# Patient Record
Sex: MALE | Race: WHITE | Employment: OTHER | URBAN - METROPOLITAN AREA
[De-identification: names, ages, dates, MRNs, and addresses within clinical notes are randomized per-mention and may not be internally consistent; named-entity substitution may affect disease eponyms.]

---

## 2020-09-16 ENCOUNTER — HOSPITAL ENCOUNTER (EMERGENCY)
Age: 70
Discharge: HOME | End: 2020-09-16
Attending: EMERGENCY MEDICINE
Payer: MEDICARE

## 2020-09-16 ENCOUNTER — APPOINTMENT (OUTPATIENT)
Dept: GENERAL RADIOLOGY | Age: 70
End: 2020-09-16
Attending: EMERGENCY MEDICINE
Payer: MEDICARE

## 2020-09-16 VITALS
OXYGEN SATURATION: 95 % | RESPIRATION RATE: 16 BRPM | TEMPERATURE: 98.2 F | BODY MASS INDEX: 21.48 KG/M2 | DIASTOLIC BLOOD PRESSURE: 89 MMHG | SYSTOLIC BLOOD PRESSURE: 129 MMHG | WEIGHT: 141.76 LBS | HEIGHT: 68 IN | HEART RATE: 75 BPM

## 2020-09-16 DIAGNOSIS — R42 DIZZINESS: ICD-10-CM

## 2020-09-16 DIAGNOSIS — I16.0 HYPERTENSIVE URGENCY: Primary | ICD-10-CM

## 2020-09-16 LAB
ALBUMIN SERPL-MCNC: 3.7 G/DL (ref 3.5–5)
ALBUMIN/GLOB SERPL: 0.9 {RATIO} (ref 1.1–2.2)
ALP SERPL-CCNC: 79 U/L (ref 45–117)
ALT SERPL-CCNC: 24 U/L (ref 12–78)
ANION GAP SERPL CALC-SCNC: 11 MMOL/L (ref 5–15)
APPEARANCE UR: CLEAR
AST SERPL-CCNC: 13 U/L (ref 15–37)
BACTERIA URNS QL MICRO: NEGATIVE /HPF
BASOPHILS # BLD: 0.1 K/UL (ref 0–0.1)
BASOPHILS NFR BLD: 1 % (ref 0–1)
BILIRUB SERPL-MCNC: 0.6 MG/DL (ref 0.2–1)
BILIRUB UR QL: NEGATIVE
BUN SERPL-MCNC: 21 MG/DL (ref 6–20)
BUN/CREAT SERPL: 12 (ref 12–20)
CALCIUM SERPL-MCNC: 9.1 MG/DL (ref 8.5–10.1)
CHLORIDE SERPL-SCNC: 103 MMOL/L (ref 97–108)
CK SERPL-CCNC: 69 U/L (ref 39–308)
CO2 SERPL-SCNC: 27 MMOL/L (ref 21–32)
COLOR UR: ABNORMAL
COMMENT, HOLDF: NORMAL
CREAT SERPL-MCNC: 1.71 MG/DL (ref 0.7–1.3)
DIFFERENTIAL METHOD BLD: ABNORMAL
EOSINOPHIL # BLD: 0.5 K/UL (ref 0–0.4)
EOSINOPHIL NFR BLD: 4 % (ref 0–7)
EPITH CASTS URNS QL MICRO: ABNORMAL /LPF
ERYTHROCYTE [DISTWIDTH] IN BLOOD BY AUTOMATED COUNT: 12.5 % (ref 11.5–14.5)
GLOBULIN SER CALC-MCNC: 4.1 G/DL (ref 2–4)
GLUCOSE SERPL-MCNC: 128 MG/DL (ref 65–100)
GLUCOSE UR STRIP.AUTO-MCNC: NEGATIVE MG/DL
HCT VFR BLD AUTO: 45.4 % (ref 36.6–50.3)
HGB BLD-MCNC: 15.1 G/DL (ref 12.1–17)
HGB UR QL STRIP: ABNORMAL
IMM GRANULOCYTES # BLD AUTO: 0 K/UL (ref 0–0.04)
IMM GRANULOCYTES NFR BLD AUTO: 0 % (ref 0–0.5)
KETONES UR QL STRIP.AUTO: NEGATIVE MG/DL
LEUKOCYTE ESTERASE UR QL STRIP.AUTO: NEGATIVE
LYMPHOCYTES # BLD: 4.2 K/UL (ref 0.8–3.5)
LYMPHOCYTES NFR BLD: 33 % (ref 12–49)
MAGNESIUM SERPL-MCNC: 1.8 MG/DL (ref 1.6–2.4)
MCH RBC QN AUTO: 29.7 PG (ref 26–34)
MCHC RBC AUTO-ENTMCNC: 33.3 G/DL (ref 30–36.5)
MCV RBC AUTO: 89.4 FL (ref 80–99)
MONOCYTES # BLD: 0.8 K/UL (ref 0–1)
MONOCYTES NFR BLD: 6 % (ref 5–13)
NEUTS SEG # BLD: 7 K/UL (ref 1.8–8)
NEUTS SEG NFR BLD: 56 % (ref 32–75)
NITRITE UR QL STRIP.AUTO: NEGATIVE
NRBC # BLD: 0 K/UL (ref 0–0.01)
NRBC BLD-RTO: 0 PER 100 WBC
PH UR STRIP: 6.5 [PH] (ref 5–8)
PHOSPHATE SERPL-MCNC: 2.2 MG/DL (ref 2.6–4.7)
PLATELET # BLD AUTO: 340 K/UL (ref 150–400)
PMV BLD AUTO: 9.1 FL (ref 8.9–12.9)
POTASSIUM SERPL-SCNC: 4.2 MMOL/L (ref 3.5–5.1)
PROT SERPL-MCNC: 7.8 G/DL (ref 6.4–8.2)
PROT UR STRIP-MCNC: 100 MG/DL
RBC # BLD AUTO: 5.08 M/UL (ref 4.1–5.7)
RBC #/AREA URNS HPF: ABNORMAL /HPF (ref 0–5)
SAMPLES BEING HELD,HOLD: NORMAL
SODIUM SERPL-SCNC: 141 MMOL/L (ref 136–145)
SP GR UR REFRACTOMETRY: 1.01 (ref 1–1.03)
TROPONIN I SERPL-MCNC: <0.05 NG/ML
TSH SERPL DL<=0.05 MIU/L-ACNC: 2.15 UIU/ML (ref 0.36–3.74)
UA: UC IF INDICATED,UAUC: ABNORMAL
UROBILINOGEN UR QL STRIP.AUTO: 0.2 EU/DL (ref 0.2–1)
WBC # BLD AUTO: 12.6 K/UL (ref 4.1–11.1)
WBC URNS QL MICRO: ABNORMAL /HPF (ref 0–4)

## 2020-09-16 PROCEDURE — 96374 THER/PROPH/DIAG INJ IV PUSH: CPT

## 2020-09-16 PROCEDURE — 84443 ASSAY THYROID STIM HORMONE: CPT

## 2020-09-16 PROCEDURE — 84484 ASSAY OF TROPONIN QUANT: CPT

## 2020-09-16 PROCEDURE — 82550 ASSAY OF CK (CPK): CPT

## 2020-09-16 PROCEDURE — 71045 X-RAY EXAM CHEST 1 VIEW: CPT

## 2020-09-16 PROCEDURE — 84100 ASSAY OF PHOSPHORUS: CPT

## 2020-09-16 PROCEDURE — 85025 COMPLETE CBC W/AUTO DIFF WBC: CPT

## 2020-09-16 PROCEDURE — 81001 URINALYSIS AUTO W/SCOPE: CPT

## 2020-09-16 PROCEDURE — 99285 EMERGENCY DEPT VISIT HI MDM: CPT

## 2020-09-16 PROCEDURE — 80053 COMPREHEN METABOLIC PANEL: CPT

## 2020-09-16 PROCEDURE — 83735 ASSAY OF MAGNESIUM: CPT

## 2020-09-16 PROCEDURE — 93005 ELECTROCARDIOGRAM TRACING: CPT

## 2020-09-16 PROCEDURE — 74011000250 HC RX REV CODE- 250: Performed by: EMERGENCY MEDICINE

## 2020-09-16 PROCEDURE — 36415 COLL VENOUS BLD VENIPUNCTURE: CPT

## 2020-09-16 RX ORDER — AMLODIPINE BESYLATE 5 MG/1
5 TABLET ORAL DAILY
Qty: 30 TAB | Refills: 0 | Status: SHIPPED | OUTPATIENT
Start: 2020-09-16 | End: 2020-10-16

## 2020-09-16 RX ORDER — ENALAPRILAT 1.25 MG/ML
2.5 INJECTION INTRAVENOUS
Status: COMPLETED | OUTPATIENT
Start: 2020-09-16 | End: 2020-09-16

## 2020-09-16 RX ADMIN — ENALAPRILAT 2.5 MG: 1.25 INJECTION INTRAVENOUS at 22:07

## 2020-09-17 LAB
ATRIAL RATE: 98 BPM
CALCULATED P AXIS, ECG09: 75 DEGREES
CALCULATED R AXIS, ECG10: 56 DEGREES
CALCULATED T AXIS, ECG11: 67 DEGREES
DIAGNOSIS, 93000: NORMAL
P-R INTERVAL, ECG05: 130 MS
Q-T INTERVAL, ECG07: 364 MS
QRS DURATION, ECG06: 86 MS
QTC CALCULATION (BEZET), ECG08: 464 MS
VENTRICULAR RATE, ECG03: 98 BPM

## 2020-09-17 NOTE — ED TRIAGE NOTES
Patient arrives for c/o, \"feeling light headed and dizzy, pale\". Patient reports high BP at home. Patient reports symptoms began  Minutes pta. Recently stopped smoking.

## 2020-09-17 NOTE — DISCHARGE INSTRUCTIONS
Patient Education        Lightheadedness or Faintness: Care Instructions  Your Care Instructions  Lightheadedness is a feeling that you are about to faint or \"pass out. \" You do not feel as if you or your surroundings are moving. It is different from vertigo, which is the feeling that you or things around you are spinning or tilting. Lightheadedness usually goes away or gets better when you lie down. If lightheadedness gets worse, it can lead to a fainting spell. It is common to feel lightheaded from time to time. Lightheadedness usually is not caused by a serious problem. It often is caused by a short-lasting drop in blood pressure and blood flow to your head that occurs when you get up too quickly from a seated or lying position. Follow-up care is a key part of your treatment and safety. Be sure to make and go to all appointments, and call your doctor if you are having problems. It's also a good idea to know your test results and keep a list of the medicines you take. How can you care for yourself at home? · Lie down for 1 or 2 minutes when you feel lightheaded. After lying down, sit up slowly and remain sitting for 1 to 2 minutes before slowly standing up. · Avoid movements, positions, or activities that have made you lightheaded in the past.  · Get plenty of rest, especially if you have a cold or flu, which can cause lightheadedness. · Make sure you drink plenty of fluids, especially if you have a fever or have been sweating. · Do not drive or put yourself and others in danger while you feel lightheaded. When should you call for help? Call 911 anytime you think you may need emergency care. For example, call if:    · You have symptoms of a stroke. These may include:  ? Sudden numbness, tingling, weakness, or loss of movement in your face, arm, or leg, especially on only one side of your body. ? Sudden vision changes. ? Sudden trouble speaking.   ? Sudden confusion or trouble understanding simple statements. ? Sudden problems with walking or balance. ? A sudden, severe headache that is different from past headaches.     · You have symptoms of a heart attack. These may include:  ? Chest pain or pressure, or a strange feeling in the chest.  ? Sweating. ? Shortness of breath. ? Nausea or vomiting. ? Pain, pressure, or a strange feeling in the back, neck, jaw, or upper belly or in one or both shoulders or arms. ? Lightheadedness or sudden weakness. ? A fast or irregular heartbeat. After you call 911, the  may tell you to chew 1 adult-strength or 2 to 4 low-dose aspirin. Wait for an ambulance. Do not try to drive yourself. Watch closely for changes in your health, and be sure to contact your doctor if:    · Your lightheadedness gets worse or does not get better with home care. Where can you learn more? Go to http://bessyPlexmaria.info/  Enter [id number] in the search box to learn more about \"Lightheadedness or Faintness: Care Instructions. \"  Current as of: June 26, 2019               Content Version: 12.6  © 5153-5414 [employer]. Care instructions adapted under license by [employer] (which disclaims liability or warranty for this information). If you have questions about a medical condition or this instruction, always ask your healthcare professional. [place] [address] any warranty or liability for your use of this information. Patient Education        Acute High Blood Pressure: Care Instructions  Your Care Instructions     Acute high blood pressure is very high blood pressure. It's a serious problem. Very high blood pressure can damage your brain, heart, eyes, and kidneys. You may have been given medicines to lower your blood pressure. You may have gotten them as pills or through a needle in one of your veins. This is called an IV. And maybe you were given other medicines too.  These can be needed when high blood pressure causes other problems. To keep your blood pressure at a lower level, you may need to make healthy lifestyle changes. And you will probably need to take medicines. Be sure to follow up with your doctor about your blood pressure and what you can do about it. Follow-up care is a key part of your treatment and safety. Be sure to make and go to all appointments, and call your doctor if you are having problems. It's also a good idea to know your test results and keep a list of the medicines you take. How can you care for yourself at home? · See your doctor as often as he or she recommends. This is to make sure your blood pressure is under control. · Take your blood pressure medicine exactly as prescribed. You may take one or more types. They include diuretics, beta-blockers, ACE inhibitors, calcium channel blockers, and angiotensin II receptor blockers. Call your doctor if you think you are having a problem with your medicine. · If you take blood pressure medicine, talk to your doctor before you take decongestants or anti-inflammatory medicine, such as ibuprofen. These can raise blood pressure. · Learn how to check your blood pressure at home. Check it often. · Ask your doctor if it's okay to drink alcohol. · Talk to your doctor about lifestyle changes that can help blood pressure. These include being active and managing your weight. · Don't smoke. Smoking increases your risk for heart attack and stroke. When should you call for help? Call  911 anytime you think you may need emergency care. This may mean having symptoms that suggest that your blood pressure is causing a serious heart or blood vessel problem. Your blood pressure may be over 180/120. For example, call 911 if:    · You have symptoms of a heart attack. These may include:  ? Chest pain or pressure, or a strange feeling in the chest.  ? Sweating. ? Shortness of breath. ? Nausea or vomiting.   ? Pain, pressure, or a strange feeling in the back, neck, jaw, or upper belly or in one or both shoulders or arms. ? Lightheadedness or sudden weakness. ? A fast or irregular heartbeat.     · You have symptoms of a stroke. These may include:  ? Sudden numbness, tingling, weakness, or loss of movement in your face, arm, or leg, especially on only one side of your body. ? Sudden vision changes. ? Sudden trouble speaking. ? Sudden confusion or trouble understanding simple statements. ? Sudden problems with walking or balance. ? A sudden, severe headache that is different from past headaches.     · You have severe back or belly pain. Do not wait until your blood pressure comes down on its own. Get help right away. Call your doctor now or seek immediate care if:    · Your blood pressure is much higher than normal (such as 180/120 or higher), but you don't have symptoms.     · You think high blood pressure is causing symptoms, such as:  ? Severe headache.  ? Blurry vision. Watch closely for changes in your health, and be sure to contact your doctor if:    · Your blood pressure measures higher than your doctor recommends at least 2 times. That means the top number is higher or the bottom number is higher, or both.     · You think you may be having side effects from your blood pressure medicine. Where can you learn more? Go to http://bessy-maria.info/  Enter H919 in the search box to learn more about \"Acute High Blood Pressure: Care Instructions. \"  Current as of: December 16, 2019               Content Version: 12.6  © 8224-6514 [employer]. Care instructions adapted under license by [employer] (which disclaims liability or warranty for this information). If you have questions about a medical condition or this instruction, always ask your healthcare professional. [place] [address] any warranty or liability for your use of this information.

## 2021-03-24 ENCOUNTER — OFFICE VISIT (OUTPATIENT)
Dept: SURGERY | Age: 71
End: 2021-03-24
Payer: MEDICARE

## 2021-03-24 VITALS
OXYGEN SATURATION: 96 % | RESPIRATION RATE: 18 BRPM | BODY MASS INDEX: 20.76 KG/M2 | SYSTOLIC BLOOD PRESSURE: 175 MMHG | HEART RATE: 95 BPM | WEIGHT: 137 LBS | HEIGHT: 68 IN | TEMPERATURE: 98.5 F | DIASTOLIC BLOOD PRESSURE: 89 MMHG

## 2021-03-24 DIAGNOSIS — K40.90 RIGHT INGUINAL HERNIA: Primary | ICD-10-CM

## 2021-03-24 PROCEDURE — G8536 NO DOC ELDER MAL SCRN: HCPCS | Performed by: SURGERY

## 2021-03-24 PROCEDURE — 3017F COLORECTAL CA SCREEN DOC REV: CPT | Performed by: SURGERY

## 2021-03-24 PROCEDURE — 1101F PT FALLS ASSESS-DOCD LE1/YR: CPT | Performed by: SURGERY

## 2021-03-24 PROCEDURE — G8420 CALC BMI NORM PARAMETERS: HCPCS | Performed by: SURGERY

## 2021-03-24 PROCEDURE — G8427 DOCREV CUR MEDS BY ELIG CLIN: HCPCS | Performed by: SURGERY

## 2021-03-24 PROCEDURE — G8510 SCR DEP NEG, NO PLAN REQD: HCPCS | Performed by: SURGERY

## 2021-03-24 PROCEDURE — 99202 OFFICE O/P NEW SF 15 MIN: CPT | Performed by: SURGERY

## 2021-03-24 RX ORDER — AMLODIPINE BESYLATE 5 MG/1
5 TABLET ORAL
COMMUNITY
Start: 2021-03-23

## 2021-03-24 NOTE — H&P (VIEW-ONLY)
Subjective:  
  
Louise Sylvester  is a 72 y.o. male presents for evaluation of RIGHT inguinal hernia. Pt reports he's has hernia for years and has notice enlargement over time. He notes area has become progressively bothersome. Pt denies any bowel changes. Pt has history of COPD which is well controlled. Past Medical History:  
Diagnosis Date  COPD (chronic obstructive pulmonary disease) (HCC)  Full dentures  Hypercholesteremia  Hypertension  Right inguinal hernia 3/24/2021 No past surgical history on file. Social History Tobacco Use  Smoking status: Current Every Day Smoker  Smokeless tobacco: Never Used Substance Use Topics  Alcohol use: Not Currently No family history on file. Current Outpatient Medications on File Prior to Visit Medication Sig Dispense Refill  amLODIPine (NORVASC) 5 mg tablet  rosuvastatin calcium (CRESTOR PO) Take  by mouth. No current facility-administered medications on file prior to visit. No Known Allergies Review of Systems: 
Constitutional: No fever or chills Neurologic: No headache Eyes: No scleral icterus or irritated eyes Nose: No nasal pain or drainage Mouth: No oral lesions or sore throat Cardiac: No palpations or chest pain Pulmonary: No cough or shortness or breath; positive for COPD Gastrointestinal: No nausea, emesis, diarrhea, or constipation Genitourinary: No dysuria Musculoskeletal: No muscle or joint tenderness Skin: No rashes or lesions Psychiatric: No anxiety or depressed mood Objective:  
 
Visit Vitals BP (!) 175/89 (BP 1 Location: Left upper arm, BP Patient Position: Sitting, BP Cuff Size: Adult) Pulse 95 Temp 98.5 °F (36.9 °C) (Oral) Resp 18 Ht 5' 8\" (1.727 m) Wt 137 lb (62.1 kg) SpO2 96% BMI 20.83 kg/m² Physical Exam: 
General: No acute distress, conversant Eyes: PERRLA, no scleral icterus HENT: Normocephalic without oral lesions Neck: Trachea midline without LAD Cardiac: Normal pulse rate and rhythm Pulmonary: Symmetric chest rise with normal effort GI: Soft, NT, ND, no splenomegaly Large, reducible RIGHT inguinal hernia. LEFT side is normal.  
Skin: Warm without rash Extremities: No edema or joint stiffness Psych: Appropriate mood and affect Labs: No results found for this or any previous visit (from the past 24 hour(s)). Data Review: All previous imaging, testing and lab work was reviewed and interpreted. Assessment and Plan: ICD-10-CM ICD-9-CM 1. Right inguinal hernia  K40.90 550.90 I discussed their diagnosis thoroughly. Noted that the presence of a hernia is not a determining factor when considering surgical repair. Due to the natural progression of a hernia, this will not heal on its own and may continue to increase in size over time. Since this hernia is bothersome, recommend surgical repair as an outpatient, with possible mesh placement. Described the details of this surgery and discussed what the patient should expect for recovery. Pt should avoid any heavy lifting for 10-14 days post-operation. All questions were answered. They agree with this plan and will schedule this at their convenience. The patient was counseled at length about the risks of [place] Covid-19 during their perioperative period and any recovery window from their procedure. The patient was made aware that [place] Covid-19  may worsen their prognosis for recovering from their procedure and lend to a higher morbidity and/or mortality risk. All material risks, benefits, and reasonable alternatives including postponing the procedure were discussed. The patient does  wish to proceed with the procedure at this time. Total face to face time with patient: 15 minutes. Greater than 50% of the time was spent in counseling. This document was scribed by Raheem Morales as dictated by Dr. Pamela Selby.   
 
Signed By: Elieser Mcgill MD   
 03/24/21

## 2021-03-24 NOTE — PROGRESS NOTES
Subjective:      Chary Segal  is a 72 y.o. male presents for evaluation of RIGHT inguinal hernia. Pt reports he's has hernia for years and has notice enlargement over time. He notes area has become progressively bothersome. Pt denies any bowel changes. Pt has history of COPD which is well controlled. Past Medical History:   Diagnosis Date    COPD (chronic obstructive pulmonary disease) ([address]     Full dentures     Hypercholesteremia     Hypertension     Right inguinal hernia 3/24/2021       No past surgical history on file. Social History     Tobacco Use    Smoking status: Current Every Day Smoker    Smokeless tobacco: Never Used   Substance Use Topics    Alcohol use: Not Currently       No family history on file. Current Outpatient Medications on File Prior to Visit   Medication Sig Dispense Refill    amLODIPine (NORVASC) 5 mg tablet       rosuvastatin calcium (CRESTOR PO) Take  by mouth. No current facility-administered medications on file prior to visit.         No Known Allergies      Review of Systems:  Constitutional: No fever or chills  Neurologic: No headache  Eyes: No scleral icterus or irritated eyes  Nose: No nasal pain or drainage  Mouth: No oral lesions or sore throat  Cardiac: No palpations or chest pain  Pulmonary: No cough or shortness or breath; positive for COPD  Gastrointestinal: No nausea, emesis, diarrhea, or constipation  Genitourinary: No dysuria  Musculoskeletal: No muscle or joint tenderness  Skin: No rashes or lesions  Psychiatric: No anxiety or depressed mood    Objective:     Visit Vitals  BP (!) 175/89 (BP 1 Location: Left upper arm, BP Patient Position: Sitting, BP Cuff Size: Adult)   Pulse 95   Temp 98.5 °F (36.9 °C) (Oral)   Resp 18   Ht 5' 8\" (1.727 m)   Wt 137 lb (62.1 kg)   SpO2 96%   BMI 20.83 kg/m²        Physical Exam:  General: No acute distress, conversant  Eyes: PERRLA, no scleral icterus  HENT: Normocephalic without oral lesions  Neck: Trachea midline without LAD  Cardiac: Normal pulse rate and rhythm  Pulmonary: Symmetric chest rise with normal effort  GI: Soft, NT, ND, no splenomegaly   Large, reducible RIGHT inguinal hernia. LEFT side is normal.   Skin: Warm without rash  Extremities: No edema or joint stiffness  Psych: Appropriate mood and affect    Labs: No results found for this or any previous visit (from the past 24 hour(s)). Data Review: All previous imaging, testing and lab work was reviewed and interpreted. Assessment and Plan:       ICD-10-CM ICD-9-CM    1. Right inguinal hernia  K40.90 550.90        I discussed their diagnosis thoroughly. Noted that the presence of a hernia is not a determining factor when considering surgical repair. Due to the natural progression of a hernia, this will not heal on its own and may continue to increase in size over time. Since this hernia is bothersome, recommend surgical repair as an outpatient, with possible mesh placement. Described the details of this surgery and discussed what the patient should expect for recovery. Pt should avoid any heavy lifting for 10-14 days post-operation. All questions were answered. They agree with this plan and will schedule this at their convenience. The patient was counseled at length about the risks of [place] Covid-19 during their perioperative period and any recovery window from their procedure. The patient was made aware that [place] Covid-19  may worsen their prognosis for recovering from their procedure and lend to a higher morbidity and/or mortality risk. All material risks, benefits, and reasonable alternatives including postponing the procedure were discussed. The patient does  wish to proceed with the procedure at this time. Total face to face time with patient: 15 minutes. Greater than 50% of the time was spent in counseling. This document was scribed by Rajwinder Mcelroy as dictated by Dr. Em Singh.      Signed By: Blake Herbert, MD     03/24/21

## 2021-04-01 ENCOUNTER — HOSPITAL ENCOUNTER (OUTPATIENT)
Dept: PREADMISSION TESTING | Age: 71
Discharge: HOME | End: 2021-04-01
Payer: MEDICARE

## 2021-04-01 VITALS
BODY MASS INDEX: 21.35 KG/M2 | TEMPERATURE: 98.2 F | WEIGHT: 136.02 LBS | HEIGHT: 67 IN | HEART RATE: 89 BPM | SYSTOLIC BLOOD PRESSURE: 149 MMHG | DIASTOLIC BLOOD PRESSURE: 84 MMHG

## 2021-04-01 LAB
ATRIAL RATE: 79 BPM
BASOPHILS # BLD: 0.1 K/UL (ref 0–0.1)
BASOPHILS NFR BLD: 1 % (ref 0–1)
CALCULATED P AXIS, ECG09: 64 DEGREES
CALCULATED R AXIS, ECG10: 54 DEGREES
CALCULATED T AXIS, ECG11: 55 DEGREES
DIAGNOSIS, 93000: NORMAL
DIFFERENTIAL METHOD BLD: ABNORMAL
EOSINOPHIL # BLD: 0.2 K/UL (ref 0–0.4)
EOSINOPHIL NFR BLD: 2 % (ref 0–7)
ERYTHROCYTE [DISTWIDTH] IN BLOOD BY AUTOMATED COUNT: 12.6 % (ref 11.5–14.5)
HCT VFR BLD AUTO: 45.9 % (ref 36.6–50.3)
HGB BLD-MCNC: 15 G/DL (ref 12.1–17)
IMM GRANULOCYTES # BLD AUTO: 0 K/UL (ref 0–0.04)
IMM GRANULOCYTES NFR BLD AUTO: 1 % (ref 0–0.5)
LYMPHOCYTES # BLD: 2.5 K/UL (ref 0.8–3.5)
LYMPHOCYTES NFR BLD: 29 % (ref 12–49)
MCH RBC QN AUTO: 29.3 PG (ref 26–34)
MCHC RBC AUTO-ENTMCNC: 32.7 G/DL (ref 30–36.5)
MCV RBC AUTO: 89.6 FL (ref 80–99)
MONOCYTES # BLD: 0.6 K/UL (ref 0–1)
MONOCYTES NFR BLD: 7 % (ref 5–13)
NEUTS SEG # BLD: 5.1 K/UL (ref 1.8–8)
NEUTS SEG NFR BLD: 60 % (ref 32–75)
NRBC # BLD: 0 K/UL (ref 0–0.01)
NRBC BLD-RTO: 0 PER 100 WBC
P-R INTERVAL, ECG05: 138 MS
PLATELET # BLD AUTO: 388 K/UL (ref 150–400)
PMV BLD AUTO: 9.2 FL (ref 8.9–12.9)
Q-T INTERVAL, ECG07: 378 MS
QRS DURATION, ECG06: 82 MS
QTC CALCULATION (BEZET), ECG08: 433 MS
RBC # BLD AUTO: 5.12 M/UL (ref 4.1–5.7)
VENTRICULAR RATE, ECG03: 79 BPM
WBC # BLD AUTO: 8.5 K/UL (ref 4.1–11.1)

## 2021-04-01 PROCEDURE — 93005 ELECTROCARDIOGRAM TRACING: CPT

## 2021-04-01 PROCEDURE — 36415 COLL VENOUS BLD VENIPUNCTURE: CPT

## 2021-04-01 PROCEDURE — 85025 COMPLETE CBC W/AUTO DIFF WBC: CPT

## 2021-04-01 NOTE — PERIOP NOTES
PATIENT MADE AWARE OF NEED FOR COVID-19 TESTING WITHIN 96 HOURS OF SURGERY. PATIENT INSTRUCTED TO EXPECT A CALL TO SCHEDULE APPT FOR TEST. PATIENT INSTRUCTED TO SELF QUARANTINE BETWEEN TESTING AND ARRIVAL TIME DAY OF SURGERY. Patient verbalizes understanding of preoperative instructions:  Given skin prep chlorhexidine wipes-given written and verbal instructions on use. Patient given surgical site infection FAQs handout and hand hygiene tips sheet. Pre-operative instructions reviewed and patient verbalizes understanding of instructions. Patient has been given the opportunity to ask additional questions.

## 2021-04-12 ENCOUNTER — TRANSCRIBE ORDER (OUTPATIENT)
Dept: REGISTRATION | Age: 71
End: 2021-04-12

## 2021-04-12 ENCOUNTER — HOSPITAL ENCOUNTER (OUTPATIENT)
Dept: PREADMISSION TESTING | Age: 71
Discharge: HOME | End: 2021-04-12
Payer: MEDICARE

## 2021-04-12 DIAGNOSIS — Z01.812 PRE-PROCEDURE LAB EXAM: ICD-10-CM

## 2021-04-12 DIAGNOSIS — Z01.812 PRE-PROCEDURE LAB EXAM: Primary | ICD-10-CM

## 2021-04-12 PROCEDURE — U0003 INFECTIOUS AGENT DETECTION BY NUCLEIC ACID (DNA OR RNA); SEVERE ACUTE RESPIRATORY SYNDROME CORONAVIRUS 2 (SARS-COV-2) (CORONAVIRUS DISEASE [COVID-19]), AMPLIFIED PROBE TECHNIQUE, MAKING USE OF HIGH THROUGHPUT TECHNOLOGIES AS DESCRIBED BY CMS-2020-01-R: HCPCS

## 2021-04-13 LAB — SARS-COV-2, COV2NT: NOT DETECTED

## 2021-04-15 ENCOUNTER — ANESTHESIA EVENT (OUTPATIENT)
Dept: SURGERY | Age: 71
End: 2021-04-15
Payer: MEDICARE

## 2021-04-16 ENCOUNTER — HOSPITAL ENCOUNTER (OUTPATIENT)
Age: 71
Setting detail: OUTPATIENT SURGERY
Discharge: HOME | End: 2021-04-16
Attending: SURGERY | Admitting: SURGERY
Payer: MEDICARE

## 2021-04-16 ENCOUNTER — ANESTHESIA (OUTPATIENT)
Dept: SURGERY | Age: 71
End: 2021-04-16
Payer: MEDICARE

## 2021-04-16 VITALS
DIASTOLIC BLOOD PRESSURE: 82 MMHG | HEART RATE: 71 BPM | SYSTOLIC BLOOD PRESSURE: 122 MMHG | OXYGEN SATURATION: 95 % | TEMPERATURE: 97.8 F | RESPIRATION RATE: 13 BRPM | WEIGHT: 136 LBS | BODY MASS INDEX: 21.3 KG/M2

## 2021-04-16 DIAGNOSIS — G89.18 POST-OP PAIN: Primary | ICD-10-CM

## 2021-04-16 DIAGNOSIS — K40.90 RIGHT INGUINAL HERNIA: ICD-10-CM

## 2021-04-16 PROCEDURE — 88302 TISSUE EXAM BY PATHOLOGIST: CPT

## 2021-04-16 PROCEDURE — 74011000250 HC RX REV CODE- 250: Performed by: NURSE ANESTHETIST, CERTIFIED REGISTERED

## 2021-04-16 PROCEDURE — 74011250636 HC RX REV CODE- 250/636: Performed by: SURGERY

## 2021-04-16 PROCEDURE — 74011250636 HC RX REV CODE- 250/636: Performed by: NURSE ANESTHETIST, CERTIFIED REGISTERED

## 2021-04-16 PROCEDURE — 74011250636 HC RX REV CODE- 250/636: Performed by: ANESTHESIOLOGY

## 2021-04-16 PROCEDURE — 77030002933 HC SUT MCRYL J&J -A: Performed by: SURGERY

## 2021-04-16 PROCEDURE — 77030040361 HC SLV COMPR DVT MDII -B: Performed by: SURGERY

## 2021-04-16 PROCEDURE — 74011000258 HC RX REV CODE- 258: Performed by: SURGERY

## 2021-04-16 PROCEDURE — 74011000250 HC RX REV CODE- 250: Performed by: SURGERY

## 2021-04-16 PROCEDURE — 76060000033 HC ANESTHESIA 1 TO 1.5 HR: Performed by: SURGERY

## 2021-04-16 PROCEDURE — C1781 MESH (IMPLANTABLE): HCPCS | Performed by: SURGERY

## 2021-04-16 PROCEDURE — 76210000016 HC OR PH I REC 1 TO 1.5 HR: Performed by: SURGERY

## 2021-04-16 PROCEDURE — 77030010507 HC ADH SKN DERMBND J&J -B: Performed by: SURGERY

## 2021-04-16 PROCEDURE — 77030031139 HC SUT VCRL2 J&J -A: Performed by: SURGERY

## 2021-04-16 PROCEDURE — 49505 PRP I/HERN INIT REDUC >5 YR: CPT | Performed by: SURGERY

## 2021-04-16 PROCEDURE — 74011250637 HC RX REV CODE- 250/637: Performed by: ANESTHESIOLOGY

## 2021-04-16 PROCEDURE — C9290 INJ, BUPIVACAINE LIPOSOME: HCPCS | Performed by: SURGERY

## 2021-04-16 PROCEDURE — 77030010509 HC AIRWY LMA MSK TELE -A: Performed by: ANESTHESIOLOGY

## 2021-04-16 PROCEDURE — 76010000149 HC OR TIME 1 TO 1.5 HR: Performed by: SURGERY

## 2021-04-16 PROCEDURE — 77030042556 HC PNCL CAUT -B: Performed by: SURGERY

## 2021-04-16 PROCEDURE — 2709999900 HC NON-CHARGEABLE SUPPLY: Performed by: SURGERY

## 2021-04-16 DEVICE — PERFIX PLUG, 1.6" X 1.9" (4.1 CM X 4.8 CM), LARGE (CONTENTS: 2)
Type: IMPLANTABLE DEVICE | Site: GROIN | Status: FUNCTIONAL
Brand: PERFIX

## 2021-04-16 RX ORDER — SODIUM CHLORIDE, SODIUM LACTATE, POTASSIUM CHLORIDE, CALCIUM CHLORIDE 600; 310; 30; 20 MG/100ML; MG/100ML; MG/100ML; MG/100ML
100 INJECTION, SOLUTION INTRAVENOUS CONTINUOUS
Status: DISCONTINUED | OUTPATIENT
Start: 2021-04-16 | End: 2021-04-16 | Stop reason: HOSPADM

## 2021-04-16 RX ORDER — FENTANYL CITRATE 50 UG/ML
25 INJECTION, SOLUTION INTRAMUSCULAR; INTRAVENOUS
Status: DISCONTINUED | OUTPATIENT
Start: 2021-04-16 | End: 2021-04-16 | Stop reason: HOSPADM

## 2021-04-16 RX ORDER — PROPOFOL 10 MG/ML
INJECTION, EMULSION INTRAVENOUS
Status: DISCONTINUED | OUTPATIENT
Start: 2021-04-16 | End: 2021-04-16 | Stop reason: HOSPADM

## 2021-04-16 RX ORDER — SODIUM CHLORIDE 9 MG/ML
25 INJECTION, SOLUTION INTRAVENOUS CONTINUOUS
Status: DISCONTINUED | OUTPATIENT
Start: 2021-04-16 | End: 2021-04-16 | Stop reason: HOSPADM

## 2021-04-16 RX ORDER — EPHEDRINE SULFATE/0.9% NACL/PF 50 MG/5 ML
5 SYRINGE (ML) INTRAVENOUS AS NEEDED
Status: DISCONTINUED | OUTPATIENT
Start: 2021-04-16 | End: 2021-04-16 | Stop reason: HOSPADM

## 2021-04-16 RX ORDER — LIDOCAINE HYDROCHLORIDE 20 MG/ML
INJECTION, SOLUTION EPIDURAL; INFILTRATION; INTRACAUDAL; PERINEURAL AS NEEDED
Status: DISCONTINUED | OUTPATIENT
Start: 2021-04-16 | End: 2021-04-16 | Stop reason: HOSPADM

## 2021-04-16 RX ORDER — MIDAZOLAM HYDROCHLORIDE 1 MG/ML
0.5 INJECTION, SOLUTION INTRAMUSCULAR; INTRAVENOUS
Status: DISCONTINUED | OUTPATIENT
Start: 2021-04-16 | End: 2021-04-16 | Stop reason: HOSPADM

## 2021-04-16 RX ORDER — HYDROCODONE BITARTRATE AND ACETAMINOPHEN 5; 325 MG/1; MG/1
1 TABLET ORAL
Qty: 20 TAB | Refills: 0 | Status: SHIPPED | OUTPATIENT
Start: 2021-04-16 | End: 2021-04-21

## 2021-04-16 RX ORDER — ROPIVACAINE HYDROCHLORIDE 5 MG/ML
150 INJECTION, SOLUTION EPIDURAL; INFILTRATION; PERINEURAL AS NEEDED
Status: DISCONTINUED | OUTPATIENT
Start: 2021-04-16 | End: 2021-04-16 | Stop reason: HOSPADM

## 2021-04-16 RX ORDER — OXYCODONE HYDROCHLORIDE 5 MG/1
5 TABLET ORAL AS NEEDED
Status: DISCONTINUED | OUTPATIENT
Start: 2021-04-16 | End: 2021-04-16 | Stop reason: HOSPADM

## 2021-04-16 RX ORDER — MORPHINE SULFATE 2 MG/ML
2 INJECTION, SOLUTION INTRAMUSCULAR; INTRAVENOUS
Status: DISCONTINUED | OUTPATIENT
Start: 2021-04-16 | End: 2021-04-16 | Stop reason: HOSPADM

## 2021-04-16 RX ORDER — LIDOCAINE HYDROCHLORIDE 10 MG/ML
0.1 INJECTION, SOLUTION EPIDURAL; INFILTRATION; INTRACAUDAL; PERINEURAL AS NEEDED
Status: DISCONTINUED | OUTPATIENT
Start: 2021-04-16 | End: 2021-04-16 | Stop reason: HOSPADM

## 2021-04-16 RX ORDER — MIDAZOLAM HYDROCHLORIDE 1 MG/ML
INJECTION, SOLUTION INTRAMUSCULAR; INTRAVENOUS AS NEEDED
Status: DISCONTINUED | OUTPATIENT
Start: 2021-04-16 | End: 2021-04-16 | Stop reason: HOSPADM

## 2021-04-16 RX ORDER — BUPIVACAINE HYDROCHLORIDE AND EPINEPHRINE 5; 5 MG/ML; UG/ML
INJECTION, SOLUTION EPIDURAL; INTRACAUDAL; PERINEURAL AS NEEDED
Status: DISCONTINUED | OUTPATIENT
Start: 2021-04-16 | End: 2021-04-16 | Stop reason: HOSPADM

## 2021-04-16 RX ORDER — DIPHENHYDRAMINE HYDROCHLORIDE 50 MG/ML
12.5 INJECTION, SOLUTION INTRAMUSCULAR; INTRAVENOUS AS NEEDED
Status: DISCONTINUED | OUTPATIENT
Start: 2021-04-16 | End: 2021-04-16 | Stop reason: HOSPADM

## 2021-04-16 RX ORDER — ONDANSETRON 2 MG/ML
4 INJECTION INTRAMUSCULAR; INTRAVENOUS AS NEEDED
Status: DISCONTINUED | OUTPATIENT
Start: 2021-04-16 | End: 2021-04-16 | Stop reason: HOSPADM

## 2021-04-16 RX ORDER — FENTANYL CITRATE 50 UG/ML
50 INJECTION, SOLUTION INTRAMUSCULAR; INTRAVENOUS AS NEEDED
Status: DISCONTINUED | OUTPATIENT
Start: 2021-04-16 | End: 2021-04-16 | Stop reason: HOSPADM

## 2021-04-16 RX ORDER — FENTANYL CITRATE 50 UG/ML
INJECTION, SOLUTION INTRAMUSCULAR; INTRAVENOUS AS NEEDED
Status: DISCONTINUED | OUTPATIENT
Start: 2021-04-16 | End: 2021-04-16 | Stop reason: HOSPADM

## 2021-04-16 RX ORDER — BUPIVACAINE HYDROCHLORIDE AND EPINEPHRINE 5; 5 MG/ML; UG/ML
30 INJECTION, SOLUTION EPIDURAL; INTRACAUDAL; PERINEURAL ONCE
Status: DISCONTINUED | OUTPATIENT
Start: 2021-04-16 | End: 2021-04-16 | Stop reason: HOSPADM

## 2021-04-16 RX ORDER — ACETAMINOPHEN 325 MG/1
650 TABLET ORAL ONCE
Status: COMPLETED | OUTPATIENT
Start: 2021-04-16 | End: 2021-04-16

## 2021-04-16 RX ORDER — PHENYLEPHRINE HCL IN 0.9% NACL 0.4MG/10ML
SYRINGE (ML) INTRAVENOUS AS NEEDED
Status: DISCONTINUED | OUTPATIENT
Start: 2021-04-16 | End: 2021-04-16 | Stop reason: HOSPADM

## 2021-04-16 RX ORDER — MIDAZOLAM HYDROCHLORIDE 1 MG/ML
1 INJECTION, SOLUTION INTRAMUSCULAR; INTRAVENOUS AS NEEDED
Status: DISCONTINUED | OUTPATIENT
Start: 2021-04-16 | End: 2021-04-16 | Stop reason: HOSPADM

## 2021-04-16 RX ORDER — PROPOFOL 10 MG/ML
INJECTION, EMULSION INTRAVENOUS AS NEEDED
Status: DISCONTINUED | OUTPATIENT
Start: 2021-04-16 | End: 2021-04-16 | Stop reason: HOSPADM

## 2021-04-16 RX ORDER — HYDROMORPHONE HYDROCHLORIDE 1 MG/ML
0.2 INJECTION, SOLUTION INTRAMUSCULAR; INTRAVENOUS; SUBCUTANEOUS
Status: DISCONTINUED | OUTPATIENT
Start: 2021-04-16 | End: 2021-04-16 | Stop reason: HOSPADM

## 2021-04-16 RX ORDER — SODIUM CHLORIDE, SODIUM LACTATE, POTASSIUM CHLORIDE, CALCIUM CHLORIDE 600; 310; 30; 20 MG/100ML; MG/100ML; MG/100ML; MG/100ML
1000 INJECTION, SOLUTION INTRAVENOUS CONTINUOUS
Status: DISCONTINUED | OUTPATIENT
Start: 2021-04-16 | End: 2021-04-16 | Stop reason: HOSPADM

## 2021-04-16 RX ADMIN — OXYCODONE HYDROCHLORIDE 5 MG: 5 TABLET ORAL at 10:50

## 2021-04-16 RX ADMIN — HYDROMORPHONE HYDROCHLORIDE 0.2 MG: 1 INJECTION, SOLUTION INTRAMUSCULAR; INTRAVENOUS; SUBCUTANEOUS at 10:40

## 2021-04-16 RX ADMIN — ONDANSETRON 4 MG: 2 INJECTION INTRAMUSCULAR; INTRAVENOUS at 10:35

## 2021-04-16 RX ADMIN — PROPOFOL 50 MCG/KG/MIN: 10 INJECTION, EMULSION INTRAVENOUS at 08:55

## 2021-04-16 RX ADMIN — MIDAZOLAM 2 MG: 1 INJECTION INTRAMUSCULAR; INTRAVENOUS at 08:52

## 2021-04-16 RX ADMIN — Medication 120 MCG: at 09:03

## 2021-04-16 RX ADMIN — Medication 160 MCG: at 09:07

## 2021-04-16 RX ADMIN — Medication 80 MCG: at 08:59

## 2021-04-16 RX ADMIN — SODIUM CHLORIDE, POTASSIUM CHLORIDE, SODIUM LACTATE AND CALCIUM CHLORIDE 1000 ML: 600; 310; 30; 20 INJECTION, SOLUTION INTRAVENOUS at 07:53

## 2021-04-16 RX ADMIN — FENTANYL CITRATE 25 MCG: 50 INJECTION, SOLUTION INTRAMUSCULAR; INTRAVENOUS at 09:15

## 2021-04-16 RX ADMIN — HYDROMORPHONE HYDROCHLORIDE 0.2 MG: 1 INJECTION, SOLUTION INTRAMUSCULAR; INTRAVENOUS; SUBCUTANEOUS at 10:50

## 2021-04-16 RX ADMIN — PROPOFOL 150 MG: 10 INJECTION, EMULSION INTRAVENOUS at 08:55

## 2021-04-16 RX ADMIN — FENTANYL CITRATE 25 MCG: 50 INJECTION, SOLUTION INTRAMUSCULAR; INTRAVENOUS at 09:27

## 2021-04-16 RX ADMIN — WATER 2 G: 1 INJECTION INTRAMUSCULAR; INTRAVENOUS; SUBCUTANEOUS at 08:56

## 2021-04-16 RX ADMIN — LIDOCAINE HYDROCHLORIDE 60 MG: 20 INJECTION, SOLUTION EPIDURAL; INFILTRATION; INTRACAUDAL; PERINEURAL at 08:55

## 2021-04-16 RX ADMIN — Medication 120 MCG: at 08:55

## 2021-04-16 RX ADMIN — ACETAMINOPHEN 650 MG: 325 TABLET, FILM COATED ORAL at 07:38

## 2021-04-16 RX ADMIN — FENTANYL CITRATE 25 MCG: 50 INJECTION, SOLUTION INTRAMUSCULAR; INTRAVENOUS at 09:04

## 2021-04-16 NOTE — ANESTHESIA POSTPROCEDURE EVALUATION
Post-Anesthesia Evaluation and Assessment    Patient: Georges Barber MRN: [id number]  SSN: [id number]    YOB: 1950  Age: 72 y.o. Sex: male      I have evaluated the patient and they are stable and ready for discharge from the PACU. Cardiovascular Function/Vital Signs  Visit Vitals  /73   Pulse 74   Temp 36.7 °C (98 °F)   Resp 13   Wt 61.7 kg (136 lb)   SpO2 94%   BMI 21.30 kg/m²       Patient is status post General anesthesia for Procedure(s):  RIGHT INGUINAL HERNIA REPAIR. Nausea/Vomiting: None    Postoperative hydration reviewed and adequate. Pain:  Pain Scale 1: Numeric (0 - 10) (04/16/21 1050)  Pain Intensity 1: 5 (04/16/21 1050)   Managed    Neurological Status:   Neuro (WDL): Within Defined Limits (04/16/21 0729)   At baseline    Mental Status, Level of Consciousness: Alert and  oriented to person, place, and time    Pulmonary Status:   O2 Device: None (Room air) (04/16/21 1015)   Adequate oxygenation and airway patent    Complications related to anesthesia: None    Post-anesthesia assessment completed. No concerns    Signed By: Ella Gentile MD     April 16, 2021              Procedure(s):  RIGHT INGUINAL HERNIA REPAIR. general    <BSHSIANPOST>    INITIAL Post-op Vital signs:   Vitals Value Taken Time   /70 04/16/21 1045   Temp 36.7 °C (98 °F) 04/16/21 1015   Pulse 76 04/16/21 1053   Resp 14 04/16/21 1053   SpO2 96 % 04/16/21 1053   Vitals shown include unvalidated device data.

## 2021-04-16 NOTE — BRIEF OP NOTE
Brief Postoperative Note    Patient: Isac Rico  YOB: 1950  MRN: [id number]    Date of Procedure: 4/16/2021     Pre-Op Diagnosis: RIGHT INGUINAL HERNIA    Post-Op Diagnosis: Same as preoperative diagnosis. Procedure(s):  RIGHT INGUINAL HERNIA REPAIR    Surgeon(s):  Michoacano Alicia MD    Surgical Assistant: Surg Asst-1: Yuki Causey    Anesthesia: General     Estimated Blood Loss (mL): Minimal    Complications: None    Specimens:   ID Type Source Tests Collected by Time Destination   1 : Hernia Sac Right Side Fresh Hernia Sac  Michoacano lAicia MD 4/16/2021 0742 Pathology        Implants:   Implant Name Type Inv. Item Serial No.  Lot No. LRB No. Used Action   MESH [employer] LG 1.6X1. 9IN --  - SN/A  M[employer] LG 1.6X1. 9IN --  Phyllistine Mis DAVOL_WD [id number] Right 1 Implanted       Drains: * No LDAs found *    Findings: large indirect hernia    Electronically Signed by Melody Oconnor MD on 4/16/2021 at 10:20 AM

## 2021-04-16 NOTE — ROUTINE PROCESS
Patient: Georges Barber MRN: [id number]  SSN: [id number] YOB: 1950  Age: 72 y.o. Sex: male Patient is status post Procedure(s): RIGHT INGUINAL HERNIA REPAIR. Surgeon(s) and Role: Frank Harding MD - Primary Local/Dose/Irrigation:  See eMAR Peripheral IV 04/16/21 Right Forearm (Active) Phlebitis Assessment 0 04/16/21 0743 Infiltration Assessment 0 04/16/21 0743 Dressing Status Clean, dry, & intact 04/16/21 6725 Dressing Type Transparent 04/16/21 9931 Hub Color/Line Status Pink 04/16/21 0743 Airway - Endotracheal Tube 04/16/21 Oral (Active) Dressing/Packing:  Incision 04/16/21 Groin Right-Dressing/Treatment: Other (Comment)(Topical skin adhesive (dermabond)) (04/16/21 0948) Splint/Cast:  
 
Other:

## 2021-04-16 NOTE — PERIOP NOTES
Patient and spouse verbalized understanding of post-operative instructions. All belongings returned.

## 2021-04-16 NOTE — INTERVAL H&P NOTE
Update History & Physical 
 
The Patient's History and Physical of March 24, 2021 was reviewed with the patient and I examined the patient. There was no change. The surgical site was confirmed by the patient and me. Plan:  The risk, benefits, expected outcome, and alternative to the recommended procedure have been discussed with the patient. Patient understands and wants to proceed with the procedure.  
 
Electronically signed by Shannan Cali MD on 4/16/2021 at 6:38 AM

## 2021-04-16 NOTE — DISCHARGE INSTRUCTIONS
Patient Education        Hernia Repair: What to Expect at [address] are likely to have pain for the next few days. You may also feel tired and have less energy than normal. This is common. You should feel better after a few days and will probably feel much better in 7 days. For several weeks you may feel discomfort or pulling in the hernia repair when you move. You may have some bruising near the repair site and on your genitals. This is normal. If you have swelling in the genitals, you may be told to wear well-fitting briefs. Sp[employer] bicycle shorts may also provide good support. This care sheet gives you a general idea about how long it will take for you to recover. But each person recovers at a different pace. Follow the steps below to get better as quickly as possible. How can you care for yourself at home? Activity    · Rest when you feel tired. Getting enough sleep will help you recover.     · Try to walk each day. Start by walking a little more than you did the day before. Bit by bit, increase the amount you walk. Walking boosts blood flow and helps prevent pneumonia and constipation.     · Avoid strenuous activities, such as biking, jogging, weight lifting, or aerobic exercise, until your doctor says it is okay.     · Avoid lifting anything that would make you strain. This may include heavy grocery bags and milk containers, a heavy briefcase or backpack, cat litter or dog food bags, a vacuum , or a child.     · You may drive when you are no longer taking pain medicine and can quickly move your foot from the gas pedal to the brake. You must also be able to sit comfortably for a long period of time, even if you do not plan to go far. You might get caught in traffic.     · Most people are able to return to work within 1 to 2 weeks after surgery.     · You may shower 24 to 48 hours after surgery, if your doctor okays it. Pat the cut (incision) dry.  Do not take a bath for the first 2 weeks, or until your doctor tells you it is okay.     · Your doctor will tell you when you can have sex again. Diet    · You can eat your normal diet. If your stomach is upset, try bland, low-fat foods like plain rice, broiled chicken, toast, and yogurt.     · Drink plenty of fluids (unless your doctor tells you not to).     · You may notice that your bowel movements are not regular right after your surgery. This is common. Avoid constipation and straining with bowel movements. You may want to take a fiber supplement every day. If you have not had a bowel movement after a couple of days, ask your doctor about taking a mild laxative. Medicines    · Your doctor will tell you if and when you can restart your medicines. He or she will also give you instructions about taking any new medicines.     · If you take aspirin or some other blood thinner, ask your doctor if and when to start taking it again. Make sure that you understand exactly what your doctor wants you to do.     · Be safe with medicines. Take pain medicines exactly as directed. ? If the doctor gave you a prescription medicine for pain, take it as prescribed. ? If you are not taking a prescription pain medicine, take an over-the-counter medicine such as acetaminophen (Tylenol), ibuprofen (Advil, Motrin), or naproxen (Aleve). Read and follow all instructions on the label. ? Do not take two or more pain medicines at the same time unless the doctor told you to. Many pain medicines have acetaminophen, which is Tylenol. Too much acetaminophen (Tylenol) can be harmful.     · If your doctor prescribed antibiotics, take them as directed. Do not stop taking them just because you feel better. You need to take the full course of antibiotics.     · If you think your pain medicine is making you sick to your stomach:  ? Take your medicine after meals (unless your doctor has told you not to). ? Ask your doctor for a different pain medicine.    Incision care    · If you have strips of tape on the cut (incision) the doctor made, leave the tape on for a week or until it falls off.     · If you have staples closing the cut, you will need to visit your doctor in 1 to 2 weeks to have them removed.     · Wash the area daily with warm, soapy water and pat it dry. Follow-up care is a key part of your treatment and safety. Be sure to make and go to all appointments, and call your doctor if you are having problems. It's also a good idea to know your test results and keep a list of the medicines you take. When should you call for help? Call 911 anytime you think you may need emergency care. For example, call if:    · You passed out (lost consciousness).     · You are short of breath. Call your doctor now or seek immediate medical care if:    · You have pain that does not get better after you take pain medicine.     · You are sick to your stomach and cannot keep fluids down.     · You have signs of a blood clot in your leg (called a deep vein thrombosis), such as:  ? Pain in your calf, back of the knee, thigh, or groin. ? Redness and swelling in your leg or groin.     · You cannot pass stools or gas.     · Bright red blood has soaked through the bandage over your incision.     · You have loose stitches, or your incision comes open.     · You have signs of infection, such as:  ? Increased pain, swelling, warmth, or redness. ? Red streaks leading from the incision. ? Pus draining from the incision. ? A fever. Watch closely for any changes in your health, and be sure to contact your doctor if you have any problems. Where can you learn more? Go to http://www.gray.com/  Enter L570 in the search box to learn more about \"Hernia Repair: What to Expect at Home. \"  Current as of: April 15, 2020               Content Version: 12.8  © 5638-7383 [employer], Incorporated.    Care instructions adapted under license by [employer] (which disclaims liability or warranty for this information). If you have questions about a medical condition or this instruction, always ask your healthcare professional. [address] any warranty or liability for your use of this information. Patient Discharge Instructions    Roberta Jeferson / [id number] : 1950    Admitted 2021 Discharged: 2021     Take Home Medications            · It is important that you take the medication exactly as they are prescribed. · Keep your medication in the bottles provided by the pharmacist and keep a list of the medication names, dosages, and times to be taken in your wallet. · Do not take other medications without consulting your doctor. What to do at Home    Recommended diet: Regular Diet,     Recommended activity: Activity as tolerated, may shower whenever you wish          Follow-up Appointments   Procedures    FOLLOW UP VISIT Appointment in: Two Weeks     Standing Status:   Standing     Number of Occurrences:   1     Order Specific Question:   Appointment in     Answer: Two Weeks           Information obtained by :  I understand that if any problems occur once I am at home I am to contact my physician. I understand and acknowledge receipt of the instructions indicated above.                                                                                                                                            Physician's or R.N.'s Signature                                                                  Date/Time                                                                                                                                              Patient or Representative Signature                                                          Date/Time    ______________________________________________________________________    Anesthesia Discharge Instructions    After general anesthesia or intervenous sedation, for 24 hours or while taking prescription Narcotics:  · Limit your activities  · Do not drive or operate hazardous machinery  · If you have not urinated within 8 hours after discharge, please contact your surgeon on call. · Do not make important personal or business decisions  · Do not drink alcoholic beverages    Report the following to your surgeon:  · Excessive pain, swelling, redness or odor of or around the surgical area  · Temperature over 100.5 degrees  · Nausea and vomiting lasting longer than 4 hours or if unable to take medication  · Any signs of decreased circulation or nerve impairment to extremity:  Change in color, persistent numbness, tingling, coldness or increased pain.   · Any questions

## 2021-04-19 NOTE — OP NOTES
[address]   OPERATIVE REPORT    Name:  Karena Milner  MR#:  [id number]  :  1950  ACCOUNT #:  [de-identified]  DATE OF SERVICE:  2021    PREOPERATIVE DIAGNOSIS:  Right inguinal hernia. POSTOPERATIVE DIAGNOSIS:  Right inguinal hernia. PROCEDURE PERFORMED:  Repair of right inguinal hernia. SURGEON:  Tonia Cortez MD    ASSISTANT:  Lissa Sarmiento SA    ANESTHESIA:  General supplemented with Exparel. COMPLICATIONS:  None. SPECIMENS REMOVED:  Hernia sac. IMPLANTS:  Large plug and patch. ESTIMATED BLOOD LOSS:  Minimal.    DRAINS:  None. FINDINGS:  A large indirect inguinal hernia. CONDITION:  Good to the PACU. PROCEDURE:  With the patient supine and suitably anesthetized, the abdomen was prepared with ChloraPrep and draped as a field. 0.5% Marcaine with epinephrine was infiltrated around the anterior superior iliac spine at the ilioinguinal nerve and in the region of the incision. An oblique incision was made and carried down through the skin and subcutaneous tissues to the area of the external oblique which was opened in the direction of its fibers. The superior and inferior leaflets of the external oblique were then traced down to the pubic tubercle thereby encircling the cord and its contents. The cremaster muscle was really splayed out, this was opened, and the sac was grasped and dissected free. I opened the sac for better mobilization, and some of the omentum that was stuck to it was removed and placed back into the peritoneal cavity. The sac was dissected free from the vessels and the cord all the way up to its junction with the peritoneum, where it was doubly suture ligated with 2-0 Vicryl and the distal portion amputated. A large plug was placed and secured with the Vicryl suture. A patch was fashioned and secured to the pubic tubercle, shelving edge of Poupart's ligament and the conjoint tendon.   The tabs of the patch were approximated lateral to the cord and placed underneath the external oblique fascia, and then that fascia was closed with a running 0 Vicryl suture from lateral to medial.  Exparel was infiltrated everywhere except near the femoral nerve. The subcutaneous tissues were then approximated with Vicryl, and the skin was closed with subcuticular Monocryl followed by Dermabond. At the termination of the procedure, all counts were correct. The patient tolerated this well, was brought to the PACU in satisfactory condition.       Ilan Torres MD      [id number]  D:  04/19/2021 12:15  T:  04/19/2021 17:11  JOB #:  [id number]  CC:  Yessy Arroyo MD

## 2021-04-21 ENCOUNTER — TELEPHONE (OUTPATIENT)
Dept: SURGERY | Age: 71
End: 2021-04-21

## 2021-04-21 NOTE — TELEPHONE ENCOUNTER
Patient's wife called and stated that patient is still running a fever and she is concerned and would like a call back.

## 2021-04-21 NOTE — TELEPHONE ENCOUNTER
I returned patients wifes call. She states her  has been running a fever since the day after surgery. She states the lowest its been is 99.9 and the highest was on Sunday -3 days ago- and it was 102.2. She has been giving him Tylenol. He is not taking the pain medication anymore as she states his pain is manageable with the Tylenol. She states his bowels are moving. She states his 'private parts' are black and blue and red and 'every color of the rainbow'. She states she has finally been able to get him to eat , and walk the dog and yesterday he was feeling much better but today still has a temp of 100.3. She is wondering if this is normal or does he need to be seen. Per Dr. Yan Auguste, if patient is getting better then she can keep an eye on things but if not or she would feel better having him seen then we can schedule an appt. She said she will keep an eye on it today and tonight and let me know if they want to be seen. I also suggested she try an ice pack to the scrotal area several times a day for 20 minutes or so.

## 2021-05-03 ENCOUNTER — OFFICE VISIT (OUTPATIENT)
Dept: SURGERY | Age: 71
End: 2021-05-03
Payer: MEDICARE

## 2021-05-03 VITALS
BODY MASS INDEX: 21 KG/M2 | SYSTOLIC BLOOD PRESSURE: 123 MMHG | DIASTOLIC BLOOD PRESSURE: 87 MMHG | TEMPERATURE: 98.3 F | RESPIRATION RATE: 18 BRPM | WEIGHT: 133.8 LBS | HEIGHT: 67 IN | HEART RATE: 104 BPM | OXYGEN SATURATION: 96 %

## 2021-05-03 DIAGNOSIS — Z09 FOLLOW-UP EXAM: Primary | ICD-10-CM

## 2021-05-03 DIAGNOSIS — Z87.19 S/P INGUINAL HERNIA REPAIR: ICD-10-CM

## 2021-05-03 DIAGNOSIS — Z98.890 S/P INGUINAL HERNIA REPAIR: ICD-10-CM

## 2021-05-03 PROCEDURE — 99024 POSTOP FOLLOW-UP VISIT: CPT | Performed by: NURSE PRACTITIONER

## 2021-05-03 NOTE — PROGRESS NOTES
1. Have you been to the ER, urgent care clinic since your last visit? Hospitalized since your last visit? No    2. Have you seen or consulted any other health care providers outside of the [address] [place] [address] since your last visit? Include any pap smears or colon screening.  No

## 2021-05-03 NOTE — PATIENT INSTRUCTIONS
Recommendations after hernia surgery:    -  Avoid heavy lifting and or straining anything > about 20 lbs for another 4 weeks     -  Avoid abdominal exercises for another 4 weeks     -  Daily walking and resuming your normal day to day activities is encouraged and as tolerated      -  Ok to bath as normal and you may get in a swimming pool       OK to add a stool softener every day to ease bowel movements

## 2021-05-04 NOTE — PROGRESS NOTES
Chief Complaint   Patient presents with    Post OP Follow Up     2 weeks s/p Right inguinal hernia repair. Berto Draper presents 2 weeks status post right inguinal hernia repair. He is doing well. He reports he did have a fever the first 2 days he was home but it resolved after taking some Tylenol. Since that time no fever, chills, chest pain or shortness of breath. He says he is a longtime smoker and is currently wearing a patch and has really cut down. He denies a cough. Says that again the first few days he was home it was \"rough\" any had some pain in the groin. He says all of that has improved and is not taking any of the pain medication. Stools are little hard but he is having a bowel movement most days. He did take Dulcolax but then it \"ran right through him\". He has had no nausea or vomiting  Voiding without difficulty  Says he has had no redness, irritation or heat at the hernia site. He did have some scrotal swelling and bruising and he says that is significantly improved  He is walking every day for activity    Visit Vitals  /87 (BP 1 Location: Left upper arm, BP Patient Position: Sitting, BP Cuff Size: Adult)   Pulse (!) 104   Temp 98.3 °F (36.8 °C) (Oral)   Resp 18   Ht 5' 7\" (1.702 m)   Wt 133 lb 12.8 oz (60.7 kg)   SpO2 96%   BMI 20.96 kg/m²     He appears well  Alert and oriented x3  Chest is clear to auscultation and palpation  Heart is regular and mildly tachycardic  Abdomen is soft bowel sounds are present, right inguinal incision is clean, dry, intact and without erythema or induration. Some mild swelling as expected, minimal scrotal swelling and resolving bruising  Extremities are without edema and he is ambulating independently      ICD-10-CM ICD-9-CM    1. Follow-up exam  Z09 V67.9    2.  S/P inguinal hernia repair  Z98.890 V45.89     Z87.19       Doing well 2 weeks status post right inguinal hernia repair plug and patch method  Applauded his efforts with smoking cessation  Diet as tolerated  Add stool softener daily as needed and reviewed high-fiber diet as well as increasing water intake  Activity May walk, swim in a pool  Continue to avoid heavy lifting, pushing, pulling, straining or abdominal exercises (nothing more than 25 pounds) for another 4 weeks  Discharge from surgical care with as needed follow-up  [facility] Grief verbalized understanding and questions were answered to the best of my knowledge and ability. Activity  educational materials were provided.

## 2022-03-18 PROBLEM — K40.90 RIGHT INGUINAL HERNIA: Status: ACTIVE | Noted: 2021-03-24

## 2022-07-19 ENCOUNTER — APPOINTMENT (OUTPATIENT)
Dept: GENERAL RADIOLOGY | Age: 72
End: 2022-07-19
Attending: EMERGENCY MEDICINE
Payer: MEDICARE

## 2022-07-19 ENCOUNTER — HOSPITAL ENCOUNTER (EMERGENCY)
Age: 72
Discharge: HOME | End: 2022-07-19
Attending: EMERGENCY MEDICINE
Payer: MEDICARE

## 2022-07-19 VITALS
SYSTOLIC BLOOD PRESSURE: 135 MMHG | RESPIRATION RATE: 20 BRPM | HEIGHT: 66 IN | BODY MASS INDEX: 20.02 KG/M2 | TEMPERATURE: 98.8 F | OXYGEN SATURATION: 92 % | HEART RATE: 107 BPM | DIASTOLIC BLOOD PRESSURE: 74 MMHG | WEIGHT: 124.56 LBS

## 2022-07-19 DIAGNOSIS — R06.02 SOB (SHORTNESS OF BREATH): Primary | ICD-10-CM

## 2022-07-19 DIAGNOSIS — E87.1 HYPONATREMIA: ICD-10-CM

## 2022-07-19 LAB
ALBUMIN SERPL-MCNC: 3.6 G/DL (ref 3.5–5)
ALBUMIN/GLOB SERPL: 0.9 {RATIO} (ref 1.1–2.2)
ALP SERPL-CCNC: 104 U/L (ref 45–117)
ALT SERPL-CCNC: 25 U/L (ref 12–78)
ANION GAP SERPL CALC-SCNC: 1 MMOL/L (ref 5–15)
AST SERPL-CCNC: 16 U/L (ref 15–37)
BASE DEFICIT BLDV-SCNC: 1.5 MMOL/L
BASOPHILS # BLD: 0.1 K/UL (ref 0–0.1)
BASOPHILS NFR BLD: 1 % (ref 0–1)
BILIRUB SERPL-MCNC: 1.3 MG/DL (ref 0.2–1)
BNP SERPL-MCNC: 222 PG/ML (ref 0–125)
BUN SERPL-MCNC: 28 MG/DL (ref 6–20)
BUN/CREAT SERPL: 15 (ref 12–20)
CALCIUM SERPL-MCNC: 9 MG/DL (ref 8.5–10.1)
CHLORIDE SERPL-SCNC: 98 MMOL/L (ref 97–108)
CO2 SERPL-SCNC: 27 MMOL/L (ref 21–32)
COVID-19 RAPID TEST, COVR: NOT DETECTED
CREAT SERPL-MCNC: 1.88 MG/DL (ref 0.7–1.3)
D DIMER PPP FEU-MCNC: 0.6 MG/L FEU (ref 0–0.65)
DIFFERENTIAL METHOD BLD: ABNORMAL
EOSINOPHIL # BLD: 0.1 K/UL (ref 0–0.4)
EOSINOPHIL NFR BLD: 1 % (ref 0–7)
ERYTHROCYTE [DISTWIDTH] IN BLOOD BY AUTOMATED COUNT: 13.1 % (ref 11.5–14.5)
GLOBULIN SER CALC-MCNC: 4.1 G/DL (ref 2–4)
GLUCOSE SERPL-MCNC: 137 MG/DL (ref 65–100)
HCO3 BLDV-SCNC: 22.8 MMOL/L (ref 23–28)
HCT VFR BLD AUTO: 46.9 % (ref 36.6–50.3)
HGB BLD-MCNC: 15.5 G/DL (ref 12.1–17)
IMM GRANULOCYTES # BLD AUTO: 0.2 K/UL (ref 0–0.04)
IMM GRANULOCYTES NFR BLD AUTO: 2 % (ref 0–0.5)
LYMPHOCYTES # BLD: 1.3 K/UL (ref 0.8–3.5)
LYMPHOCYTES NFR BLD: 10 % (ref 12–49)
MCH RBC QN AUTO: 29.5 PG (ref 26–34)
MCHC RBC AUTO-ENTMCNC: 33 G/DL (ref 30–36.5)
MCV RBC AUTO: 89.2 FL (ref 80–99)
MONOCYTES # BLD: 1.3 K/UL (ref 0–1)
MONOCYTES NFR BLD: 10 % (ref 5–13)
NEUTS SEG # BLD: 10.3 K/UL (ref 1.8–8)
NEUTS SEG NFR BLD: 76 % (ref 32–75)
NRBC # BLD: 0 K/UL (ref 0–0.01)
NRBC BLD-RTO: 0 PER 100 WBC
PCO2 BLDV: 36.4 MMHG (ref 41–51)
PH BLDV: 7.4 [PH] (ref 7.32–7.42)
PLATELET # BLD AUTO: 314 K/UL (ref 150–400)
PMV BLD AUTO: 9.1 FL (ref 8.9–12.9)
PO2 BLDV: 31 MMHG (ref 25–40)
POTASSIUM SERPL-SCNC: 3.7 MMOL/L (ref 3.5–5.1)
PROT SERPL-MCNC: 7.7 G/DL (ref 6.4–8.2)
RBC # BLD AUTO: 5.26 M/UL (ref 4.1–5.7)
SAO2 % BLDV: 60.4 % (ref 65–88)
SERVICE CMNT-IMP: ABNORMAL
SODIUM SERPL-SCNC: 126 MMOL/L (ref 136–145)
SOURCE, COVRS: NORMAL
SPECIMEN TYPE: ABNORMAL
TROPONIN-HIGH SENSITIVITY: 10 NG/L (ref 0–76)
WBC # BLD AUTO: 13.4 K/UL (ref 4.1–11.1)

## 2022-07-19 PROCEDURE — 99285 EMERGENCY DEPT VISIT HI MDM: CPT

## 2022-07-19 PROCEDURE — 82803 BLOOD GASES ANY COMBINATION: CPT

## 2022-07-19 PROCEDURE — 74011250636 HC RX REV CODE- 250/636: Performed by: EMERGENCY MEDICINE

## 2022-07-19 PROCEDURE — 74011000250 HC RX REV CODE- 250: Performed by: EMERGENCY MEDICINE

## 2022-07-19 PROCEDURE — 85025 COMPLETE CBC W/AUTO DIFF WBC: CPT

## 2022-07-19 PROCEDURE — 96374 THER/PROPH/DIAG INJ IV PUSH: CPT

## 2022-07-19 PROCEDURE — 80053 COMPREHEN METABOLIC PANEL: CPT

## 2022-07-19 PROCEDURE — 84484 ASSAY OF TROPONIN QUANT: CPT

## 2022-07-19 PROCEDURE — 87635 SARS-COV-2 COVID-19 AMP PRB: CPT

## 2022-07-19 PROCEDURE — 36415 COLL VENOUS BLD VENIPUNCTURE: CPT

## 2022-07-19 PROCEDURE — 83880 ASSAY OF NATRIURETIC PEPTIDE: CPT

## 2022-07-19 PROCEDURE — 94640 AIRWAY INHALATION TREATMENT: CPT

## 2022-07-19 PROCEDURE — 85379 FIBRIN DEGRADATION QUANT: CPT

## 2022-07-19 PROCEDURE — 93005 ELECTROCARDIOGRAM TRACING: CPT

## 2022-07-19 PROCEDURE — 71045 X-RAY EXAM CHEST 1 VIEW: CPT

## 2022-07-19 RX ORDER — ROSUVASTATIN CALCIUM 20 MG/1
TABLET, COATED ORAL
COMMUNITY
Start: 2022-06-15

## 2022-07-19 RX ORDER — PREDNISONE 50 MG/1
50 TABLET ORAL DAILY
Qty: 4 TABLET | Refills: 0 | Status: SHIPPED | OUTPATIENT
Start: 2022-07-19 | End: 2022-07-23

## 2022-07-19 RX ORDER — ALBUTEROL SULFATE 90 UG/1
AEROSOL, METERED RESPIRATORY (INHALATION)
COMMUNITY
Start: 2022-04-25 | End: 2022-07-19

## 2022-07-19 RX ORDER — IPRATROPIUM BROMIDE AND ALBUTEROL SULFATE 2.5; .5 MG/3ML; MG/3ML
3 SOLUTION RESPIRATORY (INHALATION)
Status: DISPENSED | OUTPATIENT
Start: 2022-07-19 | End: 2022-07-19

## 2022-07-19 RX ORDER — ALBUTEROL SULFATE 90 UG/1
2 AEROSOL, METERED RESPIRATORY (INHALATION)
Qty: 18 G | Refills: 0 | Status: SHIPPED | OUTPATIENT
Start: 2022-07-19

## 2022-07-19 RX ADMIN — METHYLPREDNISOLONE SODIUM SUCCINATE 125 MG: 125 INJECTION, POWDER, FOR SOLUTION INTRAMUSCULAR; INTRAVENOUS at 06:49

## 2022-07-19 RX ADMIN — IPRATROPIUM BROMIDE AND ALBUTEROL SULFATE 3 ML: .5; 3 SOLUTION RESPIRATORY (INHALATION) at 07:05

## 2022-07-19 RX ADMIN — IPRATROPIUM BROMIDE AND ALBUTEROL SULFATE 3 ML: .5; 3 SOLUTION RESPIRATORY (INHALATION) at 06:49

## 2022-07-19 NOTE — ED TRIAGE NOTES
Triage note:3 days of cough and cold symptoms runny nose productive cough and developed short of breath last night hx of copd and smoking.

## 2022-07-19 NOTE — DISCHARGE INSTRUCTIONS
Your sodium level was 126 today. It does not appear you are having any symptoms related to this. Please call your doctor today to schedule a follow-up appointment in the next 3 to 5 days to have this rechecked and to determine if more tests need to be performed to determine the cause. Please return immediately to the emergency room if you have any confusion, nausea or vomiting, or increasing fatigue as these may be signs that your low sodium level is causing symptoms. Return to the emergency department with any new or worsening symptoms. Please take the steroids and use the albuterol every 4 hours as needed for shortness of breath.   Please follow-up with your primary care doctor and if you do not have a primary care doctor we have provided you with a list to establish care with 1

## 2022-07-19 NOTE — ED PROVIDER NOTES
HPI   67-year-old male with a past medical history of COPD, hypertension, and hyperlipidemia presents to the emergency department due to shortness of breath and cough. His symptoms started last night. He says he feels like he cannot get enough air in. He says his cough is been dry. He denies any chest pain. He denies any exertional symptoms or lower extremity edema. No orthopnea. No fevers or chills. Apparently multiple family members also have an upper respiratory infection. They will taking COVID swabs which are negative. He says he does not have any inhalers at home. He has not been on steroids in some time. He currently still smokes occasionally. Past Medical History:   Diagnosis Date    Cancer ([address]     SKIN ON BACK-BASAL CELL    COPD (chronic obstructive pulmonary disease) (HCC)     Full dentures     Hypercholesteremia     Hypertension     Right inguinal hernia 3/24/2021       Past Surgical History:   Procedure Laterality Date    HX APPENDECTOMY      HX HERNIA REPAIR Right 04/16/2021    Right inguinal hernia repair.  Dr. Chanda Bravo         Family History:   Problem Relation Age of Onset    Hypertension Mother     Cancer Brother         COLON    Anesth Problems Neg Hx        Social History     Socioeconomic History    Marital status:      Spouse name: Not on file    Number of children: Not on file    Years of education: Not on file    Highest education level: Not on file   Occupational History    Not on file   Tobacco Use    Smoking status: Current Some Day Smoker     Years: 40.00    Smokeless tobacco: Never Used    Tobacco comment: 1-2 EVERY FEW DAYS (WEARS PATCH)   Vaping Use    Vaping Use: Never used   Substance and Sexual Activity    Alcohol use: Not Currently    Drug use: Never    Sexual activity: Not on file   Other Topics Concern    Not on file   Social History Narrative    Not on file     Social Determinants of Health     Financial Resource Strain:     Difficulty of Paying Living Expenses: Not on file   Food Insecurity:     Worried About Running Out of Food in the Last Year: Not on file    Candida of Food in the Last Year: Not on file   Transportation Needs:     Lack of Transportation (Medical): Not on file    Lack of Transportation (Non-Medical): Not on file   Physical Activity:     Days of Exercise per Week: Not on file    Minutes of Exercise per Session: Not on file   Stress:     Feeling of Stress : Not on file   Social Connections:     Frequency of Communication with Friends and Family: Not on file    Frequency of Social Gatherings with Friends and Family: Not on file    Attends [employer]ism Services: Not on file    Active Member of [address] [place] [address] [employer] or Organizations: Not on file    Attends Club or Organization Meetings: Not on file    Marital Status: Not on file   Intimate Partner Violence:     Fear of Current or Ex-Partner: Not on file    Emotionally Abused: Not on file    Physically Abused: Not on file    Sexually Abused: Not on file   Housing Stability:     Unable to Pay for Housing in the Last Year: Not on file    Number of [place] in the Last Year: Not on file    Unstable Housing in the Last Year: Not on file         ALLERGIES: Patient has no known allergies. Review of Systems   A complete review of systems was performed and all systems reviewed are negative unless otherwise documented in HPI    There were no vitals filed for this visit. Physical Exam  Constitutional:       Comments: Somewhat chronically ill-appearing but not acutely distressed or acutely ill   HENT:      Mouth/Throat:      Comments: Moist mucous membranes  Eyes:      Extraocular Movements: Extraocular movements intact. Comments: No scleral icterus   Neck:      Vascular: No JVD. Comments: Trachea midline  Cardiovascular:      Comments: The rate and rhythm without murmurs.   Normal capillary refill  Pulmonary: Comments: No respiratory distress. Speaking full senses without difficulty. No pursed lip breathing. No wheezes or rales. Maybe he has slightly decreased air movement bilaterally  Abdominal:      Comments: Soft and nontender   Musculoskeletal:         General: Normal range of motion. Right lower leg: No edema. Left lower leg: No edema. Skin:     General: Skin is warm and dry. Neurological:      Comments: Awake and alert. Speech is normal.  GCS 15          MDM   73-year-old man who presents with the above chief complaint. Vitals are stable. He is not tachypneic. He has good oxygen saturations. He has breath sounds but I do not appreciate any focal wheezing. He may have slightly decreased air movement. No rails. He is not in respiratory distress or using pursed lips to breathe. In addition to work-up and treatment for COPD, work-up for other causes will be performed including a D-dimer, cardiac enzymes, and chest x-ray. He will be given bronchodilators and steroids. EKG shows normal sinus rhythm with a rate of 96 and QTC of 447 with no concerning ST elevations or depressions    Patient seen towards the end of my shift. He will be signed out to Dr. Traci Berry pending the results of his work-up. He does have venous blood gas that shows normal PCO2 and no respiratory acidosis.   Patient stable condition at the time of signout    Procedures

## 2022-07-19 NOTE — ED NOTES
Received signout on patient. Chest x-ray negative, COVID-19 negative. D-dimer of 0.6 below age-adjusted cutoff of 0.7. Mild elevation of white count. Sodium of 126, last measured at 141 in September 2020. Unclear acuity, suspect more chronic. Patient denies any recent fatigue, nausea, vomiting, confusion, or any other symptoms that would be concerning for symptomatic hyponatremia. He agrees to call his primary care doctor today to schedule redraw of his labs and has follow-up scheduled on 8/1-he will try to move this up. Patient says that he is feeling significantly better at this time, back to baseline, does not have any difficulty breathing, he does not want to receive third nebulizer treatment. Discharging with prescription for steroids and albuterol. Strict return precautions given. Will discharge the patient.     Signed By: Jacob Abarca MD     July 19, 2022

## 2022-07-20 LAB
ATRIAL RATE: 96 BPM
CALCULATED P AXIS, ECG09: 80 DEGREES
CALCULATED R AXIS, ECG10: 68 DEGREES
CALCULATED T AXIS, ECG11: 49 DEGREES
DIAGNOSIS, 93000: NORMAL
P-R INTERVAL, ECG05: 118 MS
Q-T INTERVAL, ECG07: 354 MS
QRS DURATION, ECG06: 84 MS
QTC CALCULATION (BEZET), ECG08: 447 MS
VENTRICULAR RATE, ECG03: 96 BPM

## 2022-07-21 NOTE — ED PROVIDER NOTES
The patient is a 52-year-old male with a past medical history significant for hypercholesterolemia and COPD with a staff at the bedside with a complaint of lightheadedness and feeling faint today. Wife states that she took his blood pressure at home and it was 196/95 and the patient appears to be pale and ill looking. She decided to bring him to the ER for further evaluation. The patient denies any headache, fever, sore throat, cough or congestion, neck and back pain, chest pain, shortness of breath, nausea, vomiting, diarrhea, constipation, dysuria, dizziness, extremity weakness or numbness, sick contact, skin rash, recent travel, prior history of the same. The patient is currently trying to quit for me 50-pack-year history of smoking and is taking the patch. Past Medical History:   Diagnosis Date    COPD (chronic obstructive pulmonary disease) ([facility] [address]     Hypercholesteremia        History reviewed. No pertinent surgical history. History reviewed. No pertinent family history.     Social History     Socioeconomic History    Marital status: Not on file     Spouse name: Not on file    Number of children: Not on file    Years of education: Not on file    Highest education level: Not on file   Occupational History    Not on file   Social Needs    Financial resource strain: Not on file    Food insecurity     Worry: Not on file     Inability: Not on file    Transportation needs     Medical: Not on file     Non-medical: Not on file   Tobacco Use    Smoking status: Former Smoker    Smokeless tobacco: Never Used   Substance and Sexual Activity    Alcohol use: Not Currently    Drug use: Not on file    Sexual activity: Not on file   Lifestyle    Physical activity     Days per week: Not on file     Minutes per session: Not on file    Stress: Not on file   Relationships    Social connections     Talks on phone: Not on file     Gets together: Not on file     Attends [employer] service: Not on file     Active member of club or organization: Not on file     Attends meetings of clubs or organizations: Not on file     Relationship status: Not on file    Intimate partner violence     Fear of current or ex partner: Not on file     Emotionally abused: Not on file     Physically abused: Not on file     Forced sexual activity: Not on file   Other Topics Concern    Not on file   Social History Narrative    Not on file         ALLERGIES: Patient has no known allergies. Review of Systems   All other systems reviewed and are negative. Vitals:    09/16/20 2139 09/16/20 2147   BP: (!) 199/110 (!) 168/89   Pulse: (!) 101    Resp: 14    Temp: 98.2 °F (36.8 °C)    SpO2: 100%    Weight: 64.3 kg (141 lb 12.1 oz)    Height: 5' 8\" (1.727 m)             Physical Exam  Vitals signs and nursing note reviewed. Exam conducted with a chaperone present. CONSTITUTIONAL: Well-appearing; well-nourished; in no apparent distress  HEAD: Normocephalic; atraumatic  EYES: PERRL; EOM intact; conjunctiva and sclera are clear bilaterally. ENT: No rhinorrhea; normal pharynx with no tonsillar hypertrophy; mucous membranes pink/moist, no erythema, no exudate. NECK: Supple; non-tender; no cervical lymphadenopathy  CARD: Normal S1, S2; no murmurs, rubs, or gallops. Regular rate and rhythm. RESP: Normal respiratory effort; breath sounds clear and equal bilaterally; no wheezes, rhonchi, or rales. ABD: Normal bowel sounds; non-distended; non-tender; no palpable organomegaly, no masses, no bruits. Back Exam: Normal inspection; no vertebral point tenderness, no CVA tenderness. Normal range of motion. EXT: Normal ROM in all four extremities; non-tender to palpation; no swelling or deformity; distal pulses are normal, no edema. SKIN: Warm; dry; no rash.   NEURO:Alert and oriented x 3, coherent, JAYME-XII grossly intact, sensory and motor are non-focal.        MDM  Number of Diagnoses or Management Options  Diagnosis management comments: Assessment: 73-year-old male who presents to the ED with elevated blood pressure and feeling faint. The patient has a fairly exam and appears to be moderately hypertensive at this time. The patient need evaluation for ACS, thyroid disease, electrolyte abnormality, kidney disease. Plan: EKG/chest x-ray/lab/IV fluid/blood pressure monitor/serial exam/ Monitor and Reevaluate. Amount and/or Complexity of Data Reviewed  Clinical lab tests: ordered and reviewed  Tests in the radiology section of CPT®: ordered and reviewed  Tests in the medicine section of CPT®: reviewed and ordered  Discussion of test results with the performing providers: yes  Decide to obtain previous medical records or to obtain history from someone other than the patient: yes  Obtain history from someone other than the patient: yes  Review and summarize past medical records: yes  Discuss the patient with other providers: yes  Independent visualization of images, tracings, or specimens: yes    Risk of Complications, Morbidity, and/or Mortality  Presenting problems: moderate  Diagnostic procedures: moderate  Management options: moderate    Patient Progress  Patient progress: stable         Procedures    ED EKG interpretation:  Rhythm: normal sinus rhythm; and regular . Rate (approx.): 98; Axis: normal; P wave: normal; QRS interval: normal ; ST/T wave: non-specific changes; in  Lead: Diffusely; Other findings: abnormal ekg. This EKG was interpreted by Deangelo Jeffries MD,ED Provider. XRAY INTERPRETATION (ED MD)  Chest Xray  No acute process seen. Normal heart size. No bony abnormalities. No infiltrate. Román Campbell MD 10:03 PM    Progress Note:   Pt has been reexamined by Deangelo Jeffries MD. Pt is feeling much better. Symptoms have improved. All available results have been reviewed with pt and any available family. Pt understands sx, dx, and tx in ED. Care plan has been outlined and questions have been answered.  Pt is ready to go home. Will send home on dizziness and hypertensive urgency instruction. Prescription of Norvasc. Outpatient referral with PCP as needed. Written by Brennan Fried MD,11:28 PM    .   . None

## 2022-09-26 ENCOUNTER — TRANSCRIBE ORDER (OUTPATIENT)
Dept: SCHEDULING | Age: 72
End: 2022-09-26

## 2022-09-26 DIAGNOSIS — N18.32 STAGE 3B CHRONIC KIDNEY DISEASE (HCC): Primary | ICD-10-CM

## 2022-10-07 ENCOUNTER — HOSPITAL ENCOUNTER (OUTPATIENT)
Dept: ULTRASOUND IMAGING | Age: 72
Discharge: HOME | End: 2022-10-07
Attending: INTERNAL MEDICINE
Payer: MEDICARE

## 2022-10-07 DIAGNOSIS — N18.32 STAGE 3B CHRONIC KIDNEY DISEASE (HCC): ICD-10-CM

## 2022-10-07 PROCEDURE — 76770 US EXAM ABDO BACK WALL COMP: CPT

## 2024-09-04 ENCOUNTER — HOSPITAL ENCOUNTER (OUTPATIENT)
Facility: HOSPITAL | Age: 74
Discharge: HOME | End: 2024-09-07
Attending: INTERNAL MEDICINE
Payer: MEDICARE

## 2024-09-04 VITALS — HEIGHT: 66 IN | WEIGHT: 120 LBS | BODY MASS INDEX: 19.29 KG/M2

## 2024-09-04 DIAGNOSIS — F17.210 CIGARETTE NICOTINE DEPENDENCE, UNCOMPLICATED: ICD-10-CM

## 2024-09-04 PROCEDURE — 71271 CT THORAX LUNG CANCER SCR C-: CPT

## 2024-12-09 ENCOUNTER — APPOINTMENT (OUTPATIENT)
Facility: HOSPITAL | Age: 74
DRG: 388 | End: 2024-12-09
Payer: MEDICARE

## 2024-12-09 ENCOUNTER — HOSPITAL ENCOUNTER (INPATIENT)
Facility: HOSPITAL | Age: 74
LOS: 4 days | DRG: 388 | End: 2024-12-13
Attending: STUDENT IN AN ORGANIZED HEALTH CARE EDUCATION/TRAINING PROGRAM | Admitting: INTERNAL MEDICINE
Payer: MEDICARE

## 2024-12-09 DIAGNOSIS — R11.2 NAUSEA AND VOMITING, UNSPECIFIED VOMITING TYPE: Primary | ICD-10-CM

## 2024-12-09 DIAGNOSIS — K56.609 SMALL BOWEL OBSTRUCTION (HCC): ICD-10-CM

## 2024-12-09 DIAGNOSIS — K59.00 CONSTIPATION, UNSPECIFIED CONSTIPATION TYPE: ICD-10-CM

## 2024-12-09 LAB
ALBUMIN SERPL-MCNC: 3 G/DL (ref 3.5–5)
ALBUMIN/GLOB SERPL: 0.9 (ref 1.1–2.2)
ALP SERPL-CCNC: 64 U/L (ref 45–117)
ALT SERPL-CCNC: 19 U/L (ref 12–78)
ANION GAP SERPL CALC-SCNC: 11 MMOL/L (ref 2–12)
AST SERPL-CCNC: 14 U/L (ref 15–37)
BASOPHILS # BLD: 0 K/UL (ref 0–0.1)
BASOPHILS NFR BLD: 1 % (ref 0–1)
BILIRUB SERPL-MCNC: 1 MG/DL (ref 0.2–1)
BUN SERPL-MCNC: 28 MG/DL (ref 6–20)
BUN/CREAT SERPL: 13 (ref 12–20)
CALCIUM SERPL-MCNC: 9.4 MG/DL (ref 8.5–10.1)
CHLORIDE SERPL-SCNC: 103 MMOL/L (ref 97–108)
CO2 SERPL-SCNC: 26 MMOL/L (ref 21–32)
COMMENT:: NORMAL
CREAT SERPL-MCNC: 2.19 MG/DL (ref 0.7–1.3)
DIFFERENTIAL METHOD BLD: ABNORMAL
EOSINOPHIL # BLD: 0 K/UL (ref 0–0.4)
EOSINOPHIL NFR BLD: 0 % (ref 0–7)
ERYTHROCYTE [DISTWIDTH] IN BLOOD BY AUTOMATED COUNT: 18 % (ref 11.5–14.5)
GLOBULIN SER CALC-MCNC: 3.4 G/DL (ref 2–4)
GLUCOSE SERPL-MCNC: 138 MG/DL (ref 65–100)
HCT VFR BLD AUTO: 39.7 % (ref 36.6–50.3)
HGB BLD-MCNC: 13.3 G/DL (ref 12.1–17)
IMM GRANULOCYTES # BLD AUTO: 0 K/UL (ref 0–0.04)
IMM GRANULOCYTES NFR BLD AUTO: 1 % (ref 0–0.5)
LACTATE SERPL-SCNC: 0.9 MMOL/L (ref 0.4–2)
LYMPHOCYTES # BLD: 0.2 K/UL (ref 0.8–3.5)
LYMPHOCYTES NFR BLD: 5 % (ref 12–49)
MAGNESIUM SERPL-MCNC: 1.9 MG/DL (ref 1.6–2.4)
MCH RBC QN AUTO: 30.4 PG (ref 26–34)
MCHC RBC AUTO-ENTMCNC: 33.5 G/DL (ref 30–36.5)
MCV RBC AUTO: 90.6 FL (ref 80–99)
MONOCYTES # BLD: 0.3 K/UL (ref 0–1)
MONOCYTES NFR BLD: 8 % (ref 5–13)
NEUTS SEG # BLD: 2.7 K/UL (ref 1.8–8)
NEUTS SEG NFR BLD: 85 % (ref 32–75)
NRBC # BLD: 0 K/UL (ref 0–0.01)
NRBC BLD-RTO: 0 PER 100 WBC
PLATELET # BLD AUTO: 249 K/UL (ref 150–400)
PMV BLD AUTO: 8.6 FL (ref 8.9–12.9)
POTASSIUM SERPL-SCNC: 4.8 MMOL/L (ref 3.5–5.1)
PROT SERPL-MCNC: 6.4 G/DL (ref 6.4–8.2)
RBC # BLD AUTO: 4.38 M/UL (ref 4.1–5.7)
RBC MORPH BLD: ABNORMAL
SODIUM SERPL-SCNC: 140 MMOL/L (ref 136–145)
SPECIMEN HOLD: NORMAL
WBC # BLD AUTO: 3.2 K/UL (ref 4.1–11.1)

## 2024-12-09 PROCEDURE — 2580000003 HC RX 258: Performed by: INTERNAL MEDICINE

## 2024-12-09 PROCEDURE — 99285 EMERGENCY DEPT VISIT HI MDM: CPT

## 2024-12-09 PROCEDURE — 96361 HYDRATE IV INFUSION ADD-ON: CPT

## 2024-12-09 PROCEDURE — 36415 COLL VENOUS BLD VENIPUNCTURE: CPT

## 2024-12-09 PROCEDURE — 1100000000 HC RM PRIVATE

## 2024-12-09 PROCEDURE — 2580000003 HC RX 258: Performed by: STUDENT IN AN ORGANIZED HEALTH CARE EDUCATION/TRAINING PROGRAM

## 2024-12-09 PROCEDURE — 96374 THER/PROPH/DIAG INJ IV PUSH: CPT

## 2024-12-09 PROCEDURE — 74176 CT ABD & PELVIS W/O CONTRAST: CPT

## 2024-12-09 PROCEDURE — 83735 ASSAY OF MAGNESIUM: CPT

## 2024-12-09 PROCEDURE — 80053 COMPREHEN METABOLIC PANEL: CPT

## 2024-12-09 PROCEDURE — 83605 ASSAY OF LACTIC ACID: CPT

## 2024-12-09 PROCEDURE — 85025 COMPLETE CBC W/AUTO DIFF WBC: CPT

## 2024-12-09 PROCEDURE — 6360000002 HC RX W HCPCS: Performed by: STUDENT IN AN ORGANIZED HEALTH CARE EDUCATION/TRAINING PROGRAM

## 2024-12-09 RX ORDER — 0.9 % SODIUM CHLORIDE 0.9 %
1000 INTRAVENOUS SOLUTION INTRAVENOUS ONCE
Status: COMPLETED | OUTPATIENT
Start: 2024-12-09 | End: 2024-12-09

## 2024-12-09 RX ORDER — SODIUM CHLORIDE 9 MG/ML
INJECTION, SOLUTION INTRAVENOUS CONTINUOUS
Status: DISCONTINUED | OUTPATIENT
Start: 2024-12-09 | End: 2024-12-13

## 2024-12-09 RX ORDER — IOPAMIDOL 755 MG/ML
100 INJECTION, SOLUTION INTRAVASCULAR
Status: DISCONTINUED | OUTPATIENT
Start: 2024-12-09 | End: 2024-12-09

## 2024-12-09 RX ORDER — ONDANSETRON 2 MG/ML
4 INJECTION INTRAMUSCULAR; INTRAVENOUS
Status: COMPLETED | OUTPATIENT
Start: 2024-12-09 | End: 2024-12-09

## 2024-12-09 RX ADMIN — SODIUM CHLORIDE 1000 ML: 9 INJECTION, SOLUTION INTRAVENOUS at 12:42

## 2024-12-09 RX ADMIN — SODIUM CHLORIDE: 9 INJECTION, SOLUTION INTRAVENOUS at 15:15

## 2024-12-09 RX ADMIN — ONDANSETRON 4 MG: 2 INJECTION INTRAMUSCULAR; INTRAVENOUS at 12:42

## 2024-12-09 ASSESSMENT — PAIN - FUNCTIONAL ASSESSMENT: PAIN_FUNCTIONAL_ASSESSMENT: NONE - DENIES PAIN

## 2024-12-09 NOTE — ED PROVIDER NOTES
[facility]  EMERGENCY DEPARTMENT ENCOUNTER      Pt Name: Anthony Coffey  MRN: [id number]  Birthdate 1950  Date of evaluation: 12/9/2024  Provider: Sukumar Mendiola MD    CHIEF COMPLAINT       Chief Complaint   Patient presents with    Fatigue    Vomiting       HISTORY OF PRESENT ILLNESS    HPI    74-year-old male history of COPD, lung cancer on active radiation presenting for abdominal pain nausea, vomiting and constipation.  States last bowel movement was sometime about a week ago.  States he thinks he has passed a small amount of gas, but not much.  States worsening abdominal pain and distention, multiple episodes of nausea and vomiting, not able to hold food down.  Denies fevers or chills chest pain or significant shortness of breath.    He had prior abdominal hernia surgery.    Nursing notes reviewed.    REVIEW OF SYSTEMS     Review of Systems  Unless otherwise stated, a complete review of systems was asked of the patient. Pertinent positives are noted in the HPI section.    PAST MEDICAL HISTORY     Past Medical History:   Diagnosis Date    Cancer (HCC)     SKIN ON BACK-BASAL CELL    COPD (chronic obstructive pulmonary disease) (HCC)     Full dentures     Hypercholesteremia     Hypertension     Right inguinal hernia 3/24/2021       SURGICAL HISTORY       Past Surgical History:   Procedure Laterality Date    APPENDECTOMY      HERNIA REPAIR Right 04/16/2021    Right inguinal hernia repair. Dr. Barrientos     ORTHOPEDIC SURGERY Right     SURGERY ON THUMB       CURRENT MEDICATIONS       Previous Medications    ALBUTEROL SULFATE HFA (PROVENTIL;VENTOLIN;PROAIR) 108 (90 BASE) MCG/ACT INHALER    Inhale 2 puffs into the lungs every 4 hours as needed    AMLODIPINE (NORVASC) 5 MG TABLET    5 mg nightly.    ROSUVASTATIN (CRESTOR) 20 MG TABLET    ceived the following from [facility]: Outside name: rosuvastatin (CRESTOR) 20 mg tablet       ALLERGIES     Patient has no known allergies.    FAMILY  HISTORY       Family History   Problem Relation Age of Onset    Cancer Brother         COLON    Anesth Problems Neg Hx     Hypertension Mother         SOCIAL HISTORY       Social History     Socioeconomic History    Marital status:      Spouse name: None    Number of children: None    Years of education: None    Highest education level: None   Tobacco Use    Smoking status: Some Days     Current packs/day: 1.00     Average packs/day: 1 pack/day for 40.3 years (40.3 ttl pk-yrs)     Types: Cigarettes     Start date: 1984    Smokeless tobacco: Never    Tobacco comments:     Quit smokin-2 EVERY FEW DAYS (WEARS PATCH)   Substance and Sexual Activity    Alcohol use: Not Currently    Drug use: Never       PHYSICAL EXAM       ED Triage Vitals [24 1157]   BP Systolic BP Percentile Diastolic BP Percentile Temp Temp Source Pulse Respirations SpO2   106/79 -- -- 97.9 °F (36.6 °C) Oral (!) 102 18 95 %      Height Weight - Scale         1.676 m (5' 6\") 41.3 kg (91 lb 0.8 oz)           Physical Exam  Constitutional:       Appearance: Normal appearance.   HENT:      Head: Normocephalic and atraumatic.   Eyes:      Extraocular Movements: Extraocular movements intact.      Pupils: Pupils are equal, round, and reactive to light.   Cardiovascular:      Rate and Rhythm: Normal rate and regular rhythm.   Pulmonary:      Effort: Pulmonary effort is normal.      Breath sounds: Normal breath sounds.   Abdominal:      General: Abdomen is flat. There is no distension.   Musculoskeletal:         General: No deformity or signs of injury.      Cervical back: Normal range of motion and neck supple.   Skin:     Coloration: Skin is not jaundiced.   Neurological:      General: No focal deficit present.      Mental Status: He is alert.   Psychiatric:         Mood and Affect: Mood normal.         DIAGNOSTIC RESULTS   EKG: If obtained, EKG interpretation provided in the ED course    RADIOLOGY:   CT ABDOMEN PELVIS WO CONTRAST  12/09/24 1407   Mon Dec 09, 2024   1237 WBC(!): 3.2 [AS]   1247 Creatinine(!): 2.19 [AS]   1402 CT ABDOMEN PELVIS WO CONTRAST Additional Contrast? None  IMPRESSION:  Multiple, fluid-filled, mildly dilated loops of the small bowel  throughout the abdomen and pelvis. Small amount of free intraperitoneal fluid.  No free intraperitoneal gas to suggest perforation.   [AS]      ED Course User Index  [AS] Sukumar Mendiola MD       CONSULTS:  None    PROCEDURES:  Procedures    FINAL IMPRESSION      1. Nausea and vomiting, unspecified vomiting type    2. Constipation, unspecified constipation type        Perfect Serve Consult for Admission  2:09 PM    ED Room Number: SER08/08  Patient Name and age:  Anthony Coffey 74 y.o.  male  Working Diagnosis:   1. Nausea and vomiting, unspecified vomiting type    2. Constipation, unspecified constipation type        COVID-19 Suspicion: No  Sepsis present:  No  Reassessment needed: No  Code Status:  Full Code  Readmission: No  Isolation Requirements: no  Recommended Level of Care: med/surg  Department: [place] [facility] - [phone]  Consulting Provider: n/a    Other:  74m hx lung cancer on radiation here for abdominal pain, constipation, vomiting. Has elevated creatinine 2.2 and CT showing dilated loops of bowel. Admit due to concern for developing bowel obstruction and inability to tolerate PO.      DISPOSITION/PLAN   DISPOSITION Decision To Admit 12/09/2024 02:07:38 PM    (Please note that portions of this note were completed with a voice recognition program.  Efforts were made to edit the dictations but occasionally words are mis-transcribed.)    Sukumar Mendiola MD (electronically signed)  Emergency Attending Physician      Sukumar Mendiola MD  12/09/24 1410

## 2024-12-09 NOTE — ED TRIAGE NOTES
Patient presents via [facility] with complaints of generalized weakness and unable to eat or drink since yesterday. Nausea and vomiting. Wife noted small amount of blood in vomit this morning. Patient has lung cancer and currently undergoing radiation treatments. Last treatment was Friday. Supposed to go today but unable to make it due to feeling poorly.

## 2024-12-09 NOTE — PROGRESS NOTES
Spiritual Health History and Assessment/Progress Note  [facility]    Initial Encounter,  ,  ,      Name: Anthony Coffey MRN: [id number]    Age: 74 y.o.     Sex: male   Language: English   Pentecostal: Other   Small bowel obstruction (HCC)     Date: 12/9/2024            Total Time Calculated: 18 min              Spiritual Assessment began in [facility]        Referral/Consult From: Rounding   Encounter Overview/Reason: Initial Encounter  Service Provided For: Patient and family together    Sapna, Belief, Meaning:   Patient identifies as spiritual, is connected with a sapna tradition or spiritual practice, and has beliefs or practices that help with coping during difficult times  Family/Friends identify as spiritual and have beliefs or practices that help with coping during difficult times      Importance and Influence:  Patient has spiritual/personal beliefs that influence decisions regarding their health  Family/Friends have spiritual/personal beliefs that influence decisions regarding the patient's health    Community:  Patient feels well-supported. Support system includes: Spouse/Partner  Family/Friends feel well-supported. Support system includes: Spouse/Partner    Assessment and Plan of Care:   Intro spiritual health.     Patient Interventions include: Affirmed coping skills/support systems  Family/Friends Interventions include: Affirmed coping skills/support systems    Patient Plan of Care: Spiritual Care available upon further referral  Family/Friends Plan of Care: Spiritual Care available upon further referral    Electronically signed by REV. KITTY HERNANDEZ M.Div, [facility] on 12/9/2024 at 4:07 PM

## 2024-12-10 ENCOUNTER — APPOINTMENT (OUTPATIENT)
Facility: HOSPITAL | Age: 74
DRG: 388 | End: 2024-12-10
Payer: MEDICARE

## 2024-12-10 LAB
ALBUMIN SERPL-MCNC: 2.9 G/DL (ref 3.5–5)
ALBUMIN/GLOB SERPL: 0.9 (ref 1.1–2.2)
ALP SERPL-CCNC: 61 U/L (ref 45–117)
ALT SERPL-CCNC: 15 U/L (ref 12–78)
ANION GAP SERPL CALC-SCNC: 10 MMOL/L (ref 2–12)
AST SERPL-CCNC: 11 U/L (ref 15–37)
BASOPHILS # BLD: 0 K/UL (ref 0–0.1)
BASOPHILS NFR BLD: 0 % (ref 0–1)
BILIRUB SERPL-MCNC: 0.8 MG/DL (ref 0.2–1)
BUN SERPL-MCNC: 39 MG/DL (ref 6–20)
BUN/CREAT SERPL: 16 (ref 12–20)
CALCIUM SERPL-MCNC: 9 MG/DL (ref 8.5–10.1)
CHLORIDE SERPL-SCNC: 112 MMOL/L (ref 97–108)
CO2 SERPL-SCNC: 21 MMOL/L (ref 21–32)
CREAT SERPL-MCNC: 2.42 MG/DL (ref 0.7–1.3)
DIFFERENTIAL METHOD BLD: ABNORMAL
EOSINOPHIL # BLD: 0 K/UL (ref 0–0.4)
EOSINOPHIL NFR BLD: 0 % (ref 0–7)
ERYTHROCYTE [DISTWIDTH] IN BLOOD BY AUTOMATED COUNT: 17.5 % (ref 11.5–14.5)
EST. AVERAGE GLUCOSE BLD GHB EST-MCNC: 131 MG/DL
GLOBULIN SER CALC-MCNC: 3.2 G/DL (ref 2–4)
GLUCOSE SERPL-MCNC: 107 MG/DL (ref 65–100)
HBA1C MFR BLD: 6.2 % (ref 4–5.6)
HCT VFR BLD AUTO: 34.7 % (ref 36.6–50.3)
HGB BLD-MCNC: 11.4 G/DL (ref 12.1–17)
IMM GRANULOCYTES # BLD AUTO: 0 K/UL
IMM GRANULOCYTES NFR BLD AUTO: 0 %
LYMPHOCYTES # BLD: 0.4 K/UL (ref 0.8–3.5)
LYMPHOCYTES NFR BLD: 8 % (ref 12–49)
MAGNESIUM SERPL-MCNC: 1.9 MG/DL (ref 1.6–2.4)
MCH RBC QN AUTO: 30.2 PG (ref 26–34)
MCHC RBC AUTO-ENTMCNC: 32.9 G/DL (ref 30–36.5)
MCV RBC AUTO: 92 FL (ref 80–99)
MONOCYTES # BLD: 0.4 K/UL (ref 0–1)
MONOCYTES NFR BLD: 8 % (ref 5–13)
NEUTS BAND NFR BLD MANUAL: 1 % (ref 0–6)
NEUTS SEG # BLD: 3.8 K/UL (ref 1.8–8)
NEUTS SEG NFR BLD: 83 % (ref 32–75)
NRBC # BLD: 0 K/UL (ref 0–0.01)
NRBC BLD-RTO: 0 PER 100 WBC
PHOSPHATE SERPL-MCNC: 5.2 MG/DL (ref 2.6–4.7)
PLATELET # BLD AUTO: 236 K/UL (ref 150–400)
PMV BLD AUTO: 9 FL (ref 8.9–12.9)
POTASSIUM SERPL-SCNC: 5 MMOL/L (ref 3.5–5.1)
PROT SERPL-MCNC: 6.1 G/DL (ref 6.4–8.2)
RBC # BLD AUTO: 3.77 M/UL (ref 4.1–5.7)
RBC MORPH BLD: ABNORMAL
SODIUM SERPL-SCNC: 143 MMOL/L (ref 136–145)
TSH SERPL DL<=0.05 MIU/L-ACNC: 0.72 UIU/ML (ref 0.36–3.74)
WBC # BLD AUTO: 4.6 K/UL (ref 4.1–11.1)

## 2024-12-10 PROCEDURE — 84100 ASSAY OF PHOSPHORUS: CPT

## 2024-12-10 PROCEDURE — 84443 ASSAY THYROID STIM HORMONE: CPT

## 2024-12-10 PROCEDURE — 2580000003 HC RX 258: Performed by: INTERNAL MEDICINE

## 2024-12-10 PROCEDURE — 6360000002 HC RX W HCPCS: Performed by: INTERNAL MEDICINE

## 2024-12-10 PROCEDURE — 85025 COMPLETE CBC W/AUTO DIFF WBC: CPT

## 2024-12-10 PROCEDURE — 99231 SBSQ HOSP IP/OBS SF/LOW 25: CPT | Performed by: NURSE PRACTITIONER

## 2024-12-10 PROCEDURE — 1100000000 HC RM PRIVATE

## 2024-12-10 PROCEDURE — 6360000002 HC RX W HCPCS: Performed by: SURGERY

## 2024-12-10 PROCEDURE — 83036 HEMOGLOBIN GLYCOSYLATED A1C: CPT

## 2024-12-10 PROCEDURE — 71045 X-RAY EXAM CHEST 1 VIEW: CPT

## 2024-12-10 PROCEDURE — 80053 COMPREHEN METABOLIC PANEL: CPT

## 2024-12-10 PROCEDURE — 83735 ASSAY OF MAGNESIUM: CPT

## 2024-12-10 RX ORDER — HEPARIN SODIUM 5000 [USP'U]/ML
5000 INJECTION, SOLUTION INTRAVENOUS; SUBCUTANEOUS 2 TIMES DAILY
Status: DISCONTINUED | OUTPATIENT
Start: 2024-12-10 | End: 2024-12-13

## 2024-12-10 RX ORDER — METOCLOPRAMIDE HYDROCHLORIDE 5 MG/ML
7.5 INJECTION INTRAMUSCULAR; INTRAVENOUS EVERY 6 HOURS
Status: DISCONTINUED | OUTPATIENT
Start: 2024-12-10 | End: 2024-12-12

## 2024-12-10 RX ORDER — METOCLOPRAMIDE HYDROCHLORIDE 5 MG/ML
10 INJECTION INTRAMUSCULAR; INTRAVENOUS EVERY 6 HOURS
Status: DISCONTINUED | OUTPATIENT
Start: 2024-12-10 | End: 2024-12-10

## 2024-12-10 RX ORDER — HYDROMORPHONE HYDROCHLORIDE 1 MG/ML
1 INJECTION, SOLUTION INTRAMUSCULAR; INTRAVENOUS; SUBCUTANEOUS EVERY 4 HOURS PRN
Status: DISCONTINUED | OUTPATIENT
Start: 2024-12-10 | End: 2024-12-13

## 2024-12-10 RX ORDER — POLYETHYLENE GLYCOL 3350 17 G/17G
17 POWDER, FOR SOLUTION ORAL DAILY PRN
Status: DISCONTINUED | OUTPATIENT
Start: 2024-12-10 | End: 2024-12-13 | Stop reason: HOSPADM

## 2024-12-10 RX ORDER — SODIUM CHLORIDE 9 MG/ML
INJECTION, SOLUTION INTRAVENOUS PRN
Status: DISCONTINUED | OUTPATIENT
Start: 2024-12-10 | End: 2024-12-13 | Stop reason: HOSPADM

## 2024-12-10 RX ORDER — SODIUM CHLORIDE 0.9 % (FLUSH) 0.9 %
5-40 SYRINGE (ML) INJECTION EVERY 12 HOURS SCHEDULED
Status: DISCONTINUED | OUTPATIENT
Start: 2024-12-10 | End: 2024-12-13

## 2024-12-10 RX ORDER — ACETAMINOPHEN 650 MG/1
650 SUPPOSITORY RECTAL EVERY 6 HOURS PRN
Status: DISCONTINUED | OUTPATIENT
Start: 2024-12-10 | End: 2024-12-13 | Stop reason: HOSPADM

## 2024-12-10 RX ORDER — SODIUM CHLORIDE 0.9 % (FLUSH) 0.9 %
5-40 SYRINGE (ML) INJECTION PRN
Status: DISCONTINUED | OUTPATIENT
Start: 2024-12-10 | End: 2024-12-13 | Stop reason: HOSPADM

## 2024-12-10 RX ORDER — SODIUM CHLORIDE 9 MG/ML
INJECTION, SOLUTION INTRAVENOUS CONTINUOUS
Status: DISCONTINUED | OUTPATIENT
Start: 2024-12-10 | End: 2024-12-10

## 2024-12-10 RX ORDER — ONDANSETRON 2 MG/ML
4 INJECTION INTRAMUSCULAR; INTRAVENOUS EVERY 6 HOURS PRN
Status: DISCONTINUED | OUTPATIENT
Start: 2024-12-10 | End: 2024-12-11

## 2024-12-10 RX ORDER — ONDANSETRON 4 MG/1
4 TABLET, ORALLY DISINTEGRATING ORAL EVERY 8 HOURS PRN
Status: DISCONTINUED | OUTPATIENT
Start: 2024-12-10 | End: 2024-12-11

## 2024-12-10 RX ORDER — ACETAMINOPHEN 325 MG/1
650 TABLET ORAL EVERY 6 HOURS PRN
Status: DISCONTINUED | OUTPATIENT
Start: 2024-12-10 | End: 2024-12-13 | Stop reason: HOSPADM

## 2024-12-10 RX ORDER — IPRATROPIUM BROMIDE AND ALBUTEROL SULFATE 2.5; .5 MG/3ML; MG/3ML
1 SOLUTION RESPIRATORY (INHALATION) EVERY 4 HOURS PRN
Status: DISCONTINUED | OUTPATIENT
Start: 2024-12-10 | End: 2024-12-13 | Stop reason: HOSPADM

## 2024-12-10 RX ADMIN — SODIUM CHLORIDE, PRESERVATIVE FREE 10 ML: 5 INJECTION INTRAVENOUS at 08:28

## 2024-12-10 RX ADMIN — SODIUM CHLORIDE: 9 INJECTION, SOLUTION INTRAVENOUS at 12:22

## 2024-12-10 RX ADMIN — PANTOPRAZOLE SODIUM 40 MG: 40 INJECTION, POWDER, FOR SOLUTION INTRAVENOUS at 08:24

## 2024-12-10 RX ADMIN — METOCLOPRAMIDE 7.5 MG: 5 INJECTION, SOLUTION INTRAMUSCULAR; INTRAVENOUS at 18:33

## 2024-12-10 RX ADMIN — HEPARIN SODIUM 5000 UNITS: 5000 INJECTION INTRAVENOUS; SUBCUTANEOUS at 08:27

## 2024-12-10 RX ADMIN — SODIUM CHLORIDE: 9 INJECTION, SOLUTION INTRAVENOUS at 00:08

## 2024-12-10 RX ADMIN — METOCLOPRAMIDE 7.5 MG: 5 INJECTION, SOLUTION INTRAMUSCULAR; INTRAVENOUS at 14:26

## 2024-12-10 RX ADMIN — HEPARIN SODIUM 5000 UNITS: 5000 INJECTION INTRAVENOUS; SUBCUTANEOUS at 20:42

## 2024-12-10 NOTE — ED NOTES
Patient stable at time of transfer. Report given to [facility] staff for transport. [facility] provided with PCS, encounter summary, and facesheet for transport. Patient out of department via stretcher accompanied by [facility] staff.

## 2024-12-10 NOTE — CONSULTS
Consult received for history of lung cancer. Patient follows with Dr. Freeman at [facility]. Discussed with hospitalist. Please consult their practice.

## 2024-12-10 NOTE — ED NOTES
TRANSFER - OUT REPORT:    Verbal report given to LIZ Dodd on Anthony Coffey  being transferred to [place] [id number] for routine progression of patient care       Report consisted of patient's Situation, Background, Assessment and   Recommendations(SBAR).     Information from the following report(s) ED SBAR, MAR, Recent Results, and Neuro Assessment was reviewed with the receiving nurse.    [place] Fall Assessment:    Presents to emergency department  because of falls (Syncope, seizure, or loss of consciousness): No  Age > 70: Yes  Altered Mental Status, Intoxication with alcohol or substance confusion (Disorientation, impaired judgment, poor safety awaremess, or inability to follow instructions): No  Impaired Mobility: Ambulates or transfers with assistive devices or assistance; Unable to ambulate or transer.: No  Nursing Judgement: Yes          Lines:   Peripheral IV 12/09/24 Right Antecubital (Active)   Site Assessment Clean, dry & intact 12/09/24 1208   Line Status Blood return noted;Brisk blood return;Normal saline locked;Specimen collected;Flushed 12/09/24 1208   Phlebitis Assessment No symptoms 12/09/24 1208   Infiltration Assessment 0 12/09/24 1208   Alcohol Cap Used No 12/09/24 1208   Dressing Status Clean, dry & intact 12/09/24 1208   Dressing Type Transparent 12/09/24 1208        Opportunity for questions and clarification was provided.      Patient transported with:  Monitor

## 2024-12-10 NOTE — CONSULTS
[facility]  Inpatient Consultation        Admit Date: 2024  Reason for Consultation: SBO  Referring Provider:Dr Teague    HPI:  Anthony Coffey is a 74 y.o. male w/ hx of lung cancer undergoing radiation and chemo presents to the ED w/ c/o abd pain and constipation since Saturday.  No vomiting, some nausea.  He had a nl BM on Friday.  He had a hernia repair in , denies any other abd surgeries.      CT  Multiple, fluid-filled, mildly dilated loops of the small bowel  throughout the abdomen and pelvis. Small amount of free intraperitoneal fluid.  No free intraperitoneal gas to suggest perforation.       Patient Active Problem List    Diagnosis Date Noted    Small bowel obstruction (HCC) 2024    Right inguinal hernia 2021     Past Medical History:   Diagnosis Date    Cancer (HCC)     SKIN ON BACK-BASAL CELL    COPD (chronic obstructive pulmonary disease) (HCC)     Full dentures     Hypercholesteremia     Hypertension     Right inguinal hernia 3/24/2021      Past Surgical History:   Procedure Laterality Date    APPENDECTOMY      HERNIA REPAIR Right 2021    Right inguinal hernia repair. Dr. Barrientos     ORTHOPEDIC SURGERY Right     SURGERY ON THUMB      Social History     Tobacco Use    Smoking status: Some Days     Current packs/day: 1.00     Average packs/day: 1 pack/day for 40.3 years (40.3 ttl pk-yrs)     Types: Cigarettes     Start date: 1984    Smokeless tobacco: Never    Tobacco comments:     Quit smokin-2 EVERY FEW DAYS (WEARS PATCH)   Substance Use Topics    Alcohol use: Not Currently      Family History   Problem Relation Age of Onset    Cancer Brother         COLON    Anesth Problems Neg Hx     Hypertension Mother       Prior to Admission medications    Medication Sig Start Date End Date Taking? Authorizing Provider   albuterol sulfate HFA (PROVENTIL;VENTOLIN;PROAIR) 108 (90 Base) MCG/ACT inhaler Inhale 2 puffs into the lungs every 4 hours as  needed 22   Automatic Reconciliation, Ar   amLODIPine (NORVASC) 5 MG tablet 5 mg nightly. 3/23/21   Automatic Reconciliation, Ar   rosuvastatin (CRESTOR) 20 MG tablet ceived the following from [facility]: Outside name: rosuvastatin (CRESTOR) 20 mg tablet 6/15/22   Automatic Reconciliation, Ar     Current Facility-Administered Medications   Medication Dose Route Frequency    sodium chloride flush 0.9 % injection 5-40 mL  5-40 mL IntraVENous 2 times per day    sodium chloride flush 0.9 % injection 5-40 mL  5-40 mL IntraVENous PRN    0.9 % sodium chloride infusion   IntraVENous PRN    heparin (porcine) injection 5,000 Units  5,000 Units SubCUTAneous BID    ondansetron (ZOFRAN-ODT) disintegrating tablet 4 mg  4 mg Oral Q8H PRN    Or    ondansetron (ZOFRAN) injection 4 mg  4 mg IntraVENous Q6H PRN    polyethylene glycol (GLYCOLAX) packet 17 g  17 g Oral Daily PRN    acetaminophen (TYLENOL) tablet 650 mg  650 mg Oral Q6H PRN    Or    acetaminophen (TYLENOL) suppository 650 mg  650 mg Rectal Q6H PRN    pantoprazole (PROTONIX) 40 mg in sodium chloride (PF) 0.9 % 10 mL injection  40 mg IntraVENous Daily    HYDROmorphone HCl PF (DILAUDID) injection 1 mg  1 mg IntraVENous Q4H PRN    ipratropium 0.5 mg-albuterol 2.5 mg (DUONEB) nebulizer solution 1 Dose  1 Dose Inhalation Q4H PRN    0.9 % sodium chloride infusion   IntraVENous Continuous     No Known Allergies       Subjective:     Review of Systems:    A comprehensive review of systems was negative except for that written in the History of Present Illness.       Objective:     Blood pressure 130/89, pulse (!) 107, temperature 98.2 °F (36.8 °C), temperature source Oral, resp. rate 15, height 1.676 m (5' 6\"), weight 41.3 kg (91 lb 0.8 oz), SpO2 96%.  Temp (24hrs), Av.1 °F (36.7 °C), Min:97.5 °F (36.4 °C), Max:98.8 °F (37.1 °C)      Recent Labs     24  1202 12/10/24  0821   WBC 3.2* 4.6   HGB 13.3 11.4*   HCT 39.7 34.7*    236     Recent

## 2024-12-10 NOTE — PROGRESS NOTES
Comprehensive Nutrition Assessment    Type and Reason for Visit: Initial, Positive nutrition screen    Nutrition Recommendations/Plan:   NPO, begin Regular diet when appropriate  NPO, add Ensure Plus TID when appropriate -- chocolate  Monitor weight, standing scale if possible  Pt may be indicated for supplemental nutrition in form of PPN, consult prn     Malnutrition Assessment:  Malnutrition Status:  Severe malnutrition (12/10/24 1146)    Context:  Chronic Illness     Findings of the 6 clinical characteristics of malnutrition:  Energy Intake:  75% or less estimated energy requirements for 1 month or longer  Weight Loss:  Greater than 7.5% over 3 months     Body Fat Loss:  Severe body fat loss Orbital, Triceps, Buccal region   Muscle Mass Loss:  Severe muscle mass loss Temples (temporalis), Clavicles (pectoralis & deltoids), Calf (gastrocnemius)  Fluid Accumulation:  Unable to assess     Strength:  Not Performed     Nutrition Assessment:    75 yo male admitted for SBO, presented with weakness, decreased appetite, N/V. PMH of COPD, HTN, dyslipidemia, lung ca on radiation therapy, CKDIII.     12/10: RD evaluation for MST. CT of abd and pelvis suggestive of SBO. Pt unable to tolerate NGT placement in ED. General surgery following, kub tomorrow.   Spoke with pt and wife at bedside, she reports pt eating mostly soft foods at home including mashed potatoes, soup, and 2 Ensures daily. Per wife, pt was dx with lung cancer ~2 months ago and weighed 138#. The radiation therapy has had effects on his throat that make it very difficult for him to swallow and consequently he has been eating much less than normal. She reports his lowest weight being 83# ~1 month ago but has gained some weight back by drinking Ensures. Unsure of accuracy of these reported weights. Per wt hx, pt weighed 120# in Sept and 104# in Oct at an OP visit. CBW 91#, this represents a 29# wt loss x 3 mo (24% -- nutritionally significant). Severe muscle

## 2024-12-10 NOTE — PROGRESS NOTES
Renal Dosing/Monitoring  Medication: Reglan   Current regimen:  10mg every 6 hr  Recent Labs     12/09/24  1202 12/10/24  0821   CREATININE 2.19* 2.42*   BUN 28* 39*     Estimated CrCl:  16 ml/min  Plan: Change to Reglan 7.5mg q6h with respect to indication and renal status (CrCl 10-60 mL/min) per [facility]-approved Renal Dosing Adjustment protocol.  Pharmacy will continue to monitor this patient daily for changes in clinical status and renal function.

## 2024-12-10 NOTE — CARE COORDINATION
Care Management Initial Assessment       RUR:14%  Readmission? No  1st IM letter given? Yes - 12/9  1st  letter given: No     CM met with patient/spouse at bedside to introduce self and role. Patient lives at home with his spouse in a 2 story house.  Spouse to transport at discharge.    Admitted for Small Bowel Obstruction    CM to follow for discharge needs.    ADLs: Independent  DME: None  PCP follow up: Jose Braswell  Previous Home Health: None  Previous Skilled Nursing Facility: None  Previous Inpatient Rehab: None  Insurance verified: Medicare A&B primary, [employer] supplement  Pharmacy: [facility] [address]  Emergency Contact: Diana Coffey(Spouse) [phone]    CM will follow patient progress and assist as needed with ODESSA plan.    12/10/24 5460   Service Assessment   Patient Orientation Alert and Oriented   Cognition Alert   History Provided By Patient   Primary Caregiver Self   Support Systems Spouse/Significant Other   PCP Verified by CM Yes   Last Visit to PCP Within last year   Prior Functional Level Independent in ADLs/IADLs   Current Functional Level Independent in ADLs/IADLs   Can patient return to prior living arrangement Yes   Ability to make needs known: Good   Family able to assist with home care needs: Yes   Would you like for me to discuss the discharge plan with any other family members/significant others, and if so, who? No   Financial Resources Medicare   Social/Functional History   Lives With Spouse   Type of Home House   Home Layout Two level   Bathroom Shower/Tub Tub/Shower unit   Bathroom Toilet Standard   Bathroom Accessibility Not accessible   Receives Help From Family   Prior Level of Assist for ADLs Independent   Prior Level of Assist for Homemaking Independent   Homemaking Responsibilities No   Ambulation Assistance Independent   Prior Level of Assist for Transfers Independent   Active  Yes   Mode of Transportation Car   Occupation Retired   Discharge Planning   Type of  Residence House   Current Services Prior To Admission None   Potential Assistance Needed N/A   DME Ordered? No   Potential Assistance Purchasing Medications No   Patient expects to be discharged to: House   Services At/After Discharge   Transition of Care Consult (CM Consult) N/A   Services At/After Discharge None    Resource Information Provided? No   Mode of Transport at Discharge Other (see comment)  (Spouse car)   Confirm Follow Up Transport Family     Tessy Grijalva RN/CRM  [phone]

## 2024-12-10 NOTE — H&P
History and Physical    Date of Service:  12/10/2024  Primary Care Provider: Jose Braswell MD  Source of information: The patient and review of EMR    Chief Complaint: Fatigue and Vomiting      History of Presenting Illness:   Anthony Coffey is a 74 y.o. male with past medical history significant for COPD, hypertension, dyslipidemia, lung cancer on radiation therapy presented at [facility] emergency room with weakness.  Patient reported weakness or generalized nonfocal weakness.  Patient also reported decreased appetite as well as nausea and vomiting.  Patient has not been eating or drinking as a result of his illness.  Patient symptoms started a couple of days ago and progressively getting worse.  His wife reported small amount of blood in the emesis in the morning.  He has not been able to go for his radiation therapy because of his illness.  CT scan of the abdomen and pelvis without contrast done on arrival at the emergency room shows findings suggestive of small bowel obstruction.  Patient was unable to tolerate NG tube placement in the emergency room.  He was referred to the hospitalist for admission.       REVIEW OF SYSTEMS:  REVIEW OF SYSTEMS:  HEAD, EYES, EARS, NOSE AND THROAT:  No headache.  No dizziness.  No blurring of vision.  No photophobia.  RESPIRATORY SYSTEM:  No shortness of breath.  No cough.  No hemoptysis.  CARDIOVASCULAR SYSTEM:  No chest pain.  No orthopnea.  No palpitations.  GASTROINTESTINAL SYSTEM:  No nausea or vomiting.  No diarrhea.  No constipation.  GENITOURINARY SYSTEM:  No dysuria, no urgency, and no frequency.     All other systems are reviewed and they are negative.        Past Medical History:   Diagnosis Date    Cancer (HCC)     SKIN ON BACK-BASAL CELL    COPD (chronic obstructive pulmonary disease) (HCC)     Full dentures     Hypercholesteremia     Hypertension     Right inguinal hernia 3/24/2021      Past Surgical History:   Procedure Laterality Date     blood pressure is fairly stable at present without medication.    5.  Dyslipidemia POA: We will resume Crestor once the patient is on longer n.p.o.    6.  Leukopenia POA: Patient is afebrile and nontoxic.  Will obtain chest x-ray and check urinalysis to evaluate the patient for sources of infection.  Will repeat lab    7.  Lung cancer POA: Patient is receiving radiation therapy at present.  Hematology consult will be requested for continuation of care.    8.  Hyperglycemia POA: We will check A1c level.  Patient has no history of diabetes.        DIET: Diet NPO   ISOLATION PRECAUTIONS: No active isolations  CODE STATUS: Full Code   Central Line:  Mediport  DVT PROPHYLAXIS: Heparin  FUNCTIONAL STATUS PRIOR TO HOSPITALIZATION: Ambulatory and capable of self-care but relies on assistive devices (rolling walker/cane).   Ambulatory status/function: Ambulates with assistance:  Walker   EARLY MOBILITY ASSESSMENT: Recommend an assessment from physical therapy and/or occupational therapy  ANTICIPATED DISCHARGE: Greater than 48 hours.  ANTICIPATED DISPOSITION: Home  EMERGENCY CONTACT/SURROGATE DECISION MAKER: víctor Blood    CRITICAL CARE WAS PERFORMED FOR THIS ENCOUNTER: NO.      Signed By: Dino Teague MD     December 10, 2024         Please note that this dictation may have been completed with Dragon, the computer voice recognition software.  Quite often unanticipated grammatical, syntax, homophones, and other interpretive errors are inadvertently transcribed by the computer software.  Please disregard these errors.  Please excuse any errors that have escaped final proofreading.

## 2024-12-10 NOTE — PROGRESS NOTES
[facility] Adult  Hospitalist Group                                                                                          Hospitalist Progress Note  MILLER Sherwood - CNP  Office Phone: [phone]        Date of Service:  12/10/2024  NAME:  Anthony Coffey  :  1950  MRN:  [id number]       Admission Summary:   Anthony Coffey is a 74 y.o. male with past medical history significant for COPD, hypertension, dyslipidemia, lung cancer on radiation therapy presented at [facility] emergency room with weakness.  Patient reported weakness or generalized nonfocal weakness.  Patient also reported decreased appetite as well as nausea and vomiting.  Patient has not been eating or drinking as a result of his illness.  Patient symptoms started a couple of days ago and progressively getting worse.  His wife reported small amount of blood in the emesis in the morning.  He has not been able to go for his radiation therapy because of his illness.  CT scan of the abdomen and pelvis without contrast done on arrival at the emergency room shows findings suggestive of small bowel obstruction.  Patient was unable to tolerate NG tube placement in the emergency room.  He was referred to the hospitalist for admission.     Interval history / Subjective:   Seen resting in bed with wife at bedside.  States he is hungry and his mouth is really dry. Wife is upset because he only had two treatments left for his cancer and now has this issue.  Patient states he is tired.       Assessment & Plan:     Small bowel obstruction  -N.p.o. for bowel rest, okay for ice chips  -Supplemental IV fluids  -General Surgery consulted    CKD stage III  -Creatinine worsened when admitted, currently at 2.4  -Suspect IVVD  -Will recheck labs after hydration, if no improvement may need to consult nephro    Lung cancer  -Patient has been receiving chemo and radiation and was down to reportedly his last 2 treatments  -Is  encounter with this patient and independently examined them on 12/10/2024 as outlined below:          General : alert x 3, awake, conversational, chronically ill-appearing, underweight, frail  HEENT: EOMI, normocephalic, atraumatic, moist mucous membranes   Neck: supple, nontender, no JVD, no masses or swelling   Respiratory: Diminished throughout, no distress, normal resp rate  Cardiovascular: regular rate and rhythm, no murmur appreciated, normal heart sounds   Abd: mild tenderness with palpation, soft, no distention, bowel sounds hypoactive  Genitourinary: no perez  Extremities: moves all, normal capillary refill,no noted edema, very thin  Neuro: alert, oriented x 3, normal speech, no obvious motor deficits   Skin: warm, dry, normal color, no rash  Psych: normal affect, normal judgment/insight, normal mood    Data Review:    Review and/or order of clinical lab test      I have personally and independently reviewed all pertinent labs, diagnostic studies, imaging, and have provided independent interpretation of the same.     Labs:     Recent Labs     12/09/24  1202 12/10/24  0821   WBC 3.2* 4.6   HGB 13.3 11.4*   HCT 39.7 34.7*    236     Recent Labs     12/09/24  1202 12/10/24  0821    143   K 4.8 5.0    112*   CO2 26 21   BUN 28* 39*   MG 1.9 1.9   PHOS  --  5.2*     Recent Labs     12/09/24  1202 12/10/24  0821   ALT 19 15   GLOB 3.4 3.2     No results for input(s): \"INR\", \"APTT\" in the last 72 hours.    Invalid input(s): \"PTP\"   No results for input(s): \"TIBC\" in the last 72 hours.    Invalid input(s): \"FE\", \"PSAT\", \"FERR\"   No results found for: \"RBCF\"   No results for input(s): \"PH\", \"PCO2\", \"PO2\" in the last 72 hours.  No results for input(s): \"CPK\" in the last 72 hours.    Invalid input(s): \"CPKMB\", \"CKNDX\", \"TROIQ\"  No results found for: \"CHOL\", \"CHLST\", \"CHOLV\", \"HDL\", \"HDLC\", \"LDL\", \"LDLC\"  No results found for: \"GLUCPOC\"  [unfilled]    Notes reviewed from all clinical/nonclinical/nursing

## 2024-12-11 ENCOUNTER — APPOINTMENT (OUTPATIENT)
Facility: HOSPITAL | Age: 74
DRG: 388 | End: 2024-12-11
Attending: INTERNAL MEDICINE
Payer: MEDICARE

## 2024-12-11 ENCOUNTER — APPOINTMENT (OUTPATIENT)
Facility: HOSPITAL | Age: 74
DRG: 388 | End: 2024-12-11
Payer: MEDICARE

## 2024-12-11 PROBLEM — E43 SEVERE MALNUTRITION (HCC): Status: ACTIVE | Noted: 2024-12-11

## 2024-12-11 LAB
ALBUMIN SERPL-MCNC: 2.9 G/DL (ref 3.5–5)
ALBUMIN/GLOB SERPL: 1.1 (ref 1.1–2.2)
ALP SERPL-CCNC: 57 U/L (ref 45–117)
ALT SERPL-CCNC: 13 U/L (ref 12–78)
ANION GAP SERPL CALC-SCNC: 12 MMOL/L (ref 2–12)
AST SERPL-CCNC: 9 U/L (ref 15–37)
BILIRUB SERPL-MCNC: 0.8 MG/DL (ref 0.2–1)
BUN SERPL-MCNC: 45 MG/DL (ref 6–20)
BUN/CREAT SERPL: 19 (ref 12–20)
CALCIUM SERPL-MCNC: 8.5 MG/DL (ref 8.5–10.1)
CHLORIDE SERPL-SCNC: 112 MMOL/L (ref 97–108)
CO2 SERPL-SCNC: 19 MMOL/L (ref 21–32)
CREAT SERPL-MCNC: 2.36 MG/DL (ref 0.7–1.3)
EKG ATRIAL RATE: 109 BPM
EKG DIAGNOSIS: NORMAL
EKG P AXIS: 81 DEGREES
EKG P-R INTERVAL: 122 MS
EKG Q-T INTERVAL: 296 MS
EKG QRS DURATION: 80 MS
EKG QTC CALCULATION (BAZETT): 398 MS
EKG R AXIS: 83 DEGREES
EKG T AXIS: 266 DEGREES
EKG VENTRICULAR RATE: 109 BPM
ERYTHROCYTE [DISTWIDTH] IN BLOOD BY AUTOMATED COUNT: 17.8 % (ref 11.5–14.5)
GLOBULIN SER CALC-MCNC: 2.6 G/DL (ref 2–4)
GLUCOSE BLD STRIP.AUTO-MCNC: 114 MG/DL (ref 65–117)
GLUCOSE SERPL-MCNC: 87 MG/DL (ref 65–100)
HCT VFR BLD AUTO: 31.7 % (ref 36.6–50.3)
HGB BLD-MCNC: 10.5 G/DL (ref 12.1–17)
LIPASE SERPL-CCNC: 43 U/L (ref 13–75)
MCH RBC QN AUTO: 30.7 PG (ref 26–34)
MCHC RBC AUTO-ENTMCNC: 33.1 G/DL (ref 30–36.5)
MCV RBC AUTO: 92.7 FL (ref 80–99)
NRBC # BLD: 0 K/UL (ref 0–0.01)
NRBC BLD-RTO: 0 PER 100 WBC
PLATELET # BLD AUTO: 217 K/UL (ref 150–400)
PMV BLD AUTO: 8.7 FL (ref 8.9–12.9)
POTASSIUM SERPL-SCNC: 4.6 MMOL/L (ref 3.5–5.1)
PROT SERPL-MCNC: 5.5 G/DL (ref 6.4–8.2)
RBC # BLD AUTO: 3.42 M/UL (ref 4.1–5.7)
SERVICE CMNT-IMP: NORMAL
SODIUM SERPL-SCNC: 143 MMOL/L (ref 136–145)
TROPONIN I SERPL HS-MCNC: 333 NG/L (ref 0–76)
TROPONIN I SERPL HS-MCNC: 333 NG/L (ref 0–76)
WBC # BLD AUTO: 5 K/UL (ref 4.1–11.1)

## 2024-12-11 PROCEDURE — 2580000003 HC RX 258: Performed by: NURSE PRACTITIONER

## 2024-12-11 PROCEDURE — 93306 TTE W/DOPPLER COMPLETE: CPT

## 2024-12-11 PROCEDURE — 6360000002 HC RX W HCPCS: Performed by: INTERNAL MEDICINE

## 2024-12-11 PROCEDURE — 82962 GLUCOSE BLOOD TEST: CPT

## 2024-12-11 PROCEDURE — 36415 COLL VENOUS BLD VENIPUNCTURE: CPT

## 2024-12-11 PROCEDURE — 2580000003 HC RX 258: Performed by: INTERNAL MEDICINE

## 2024-12-11 PROCEDURE — 84484 ASSAY OF TROPONIN QUANT: CPT

## 2024-12-11 PROCEDURE — 6360000002 HC RX W HCPCS: Performed by: SURGERY

## 2024-12-11 PROCEDURE — 51798 US URINE CAPACITY MEASURE: CPT

## 2024-12-11 PROCEDURE — 85027 COMPLETE CBC AUTOMATED: CPT

## 2024-12-11 PROCEDURE — 99231 SBSQ HOSP IP/OBS SF/LOW 25: CPT | Performed by: NURSE PRACTITIONER

## 2024-12-11 PROCEDURE — 83690 ASSAY OF LIPASE: CPT

## 2024-12-11 PROCEDURE — 93005 ELECTROCARDIOGRAM TRACING: CPT | Performed by: NURSE PRACTITIONER

## 2024-12-11 PROCEDURE — 1100000000 HC RM PRIVATE

## 2024-12-11 PROCEDURE — 80053 COMPREHEN METABOLIC PANEL: CPT

## 2024-12-11 PROCEDURE — 74018 RADEX ABDOMEN 1 VIEW: CPT

## 2024-12-11 RX ORDER — ONDANSETRON 2 MG/ML
4 INJECTION INTRAMUSCULAR; INTRAVENOUS EVERY 4 HOURS PRN
Status: DISCONTINUED | OUTPATIENT
Start: 2024-12-11 | End: 2024-12-13 | Stop reason: HOSPADM

## 2024-12-11 RX ORDER — ONDANSETRON 2 MG/ML
4 INJECTION INTRAMUSCULAR; INTRAVENOUS EVERY 12 HOURS SCHEDULED
Status: DISCONTINUED | OUTPATIENT
Start: 2024-12-11 | End: 2024-12-12

## 2024-12-11 RX ORDER — PROCHLORPERAZINE EDISYLATE 5 MG/ML
10 INJECTION INTRAMUSCULAR; INTRAVENOUS EVERY 6 HOURS PRN
Status: DISCONTINUED | OUTPATIENT
Start: 2024-12-11 | End: 2024-12-13 | Stop reason: HOSPADM

## 2024-12-11 RX ORDER — DEXAMETHASONE SODIUM PHOSPHATE 4 MG/ML
4 INJECTION, SOLUTION INTRA-ARTICULAR; INTRALESIONAL; INTRAMUSCULAR; INTRAVENOUS; SOFT TISSUE EVERY 12 HOURS
Status: DISCONTINUED | OUTPATIENT
Start: 2024-12-11 | End: 2024-12-12

## 2024-12-11 RX ORDER — SODIUM CHLORIDE, SODIUM LACTATE, POTASSIUM CHLORIDE, CALCIUM CHLORIDE 600; 310; 30; 20 MG/100ML; MG/100ML; MG/100ML; MG/100ML
INJECTION, SOLUTION INTRAVENOUS ONCE
Status: COMPLETED | OUTPATIENT
Start: 2024-12-11 | End: 2024-12-11

## 2024-12-11 RX ADMIN — SODIUM CHLORIDE: 9 INJECTION, SOLUTION INTRAVENOUS at 08:48

## 2024-12-11 RX ADMIN — METOCLOPRAMIDE 7.5 MG: 5 INJECTION, SOLUTION INTRAMUSCULAR; INTRAVENOUS at 13:06

## 2024-12-11 RX ADMIN — SODIUM CHLORIDE, POTASSIUM CHLORIDE, SODIUM LACTATE AND CALCIUM CHLORIDE: 600; 310; 30; 20 INJECTION, SOLUTION INTRAVENOUS at 13:08

## 2024-12-11 RX ADMIN — HEPARIN SODIUM 5000 UNITS: 5000 INJECTION INTRAVENOUS; SUBCUTANEOUS at 08:47

## 2024-12-11 RX ADMIN — METOCLOPRAMIDE 7.5 MG: 5 INJECTION, SOLUTION INTRAMUSCULAR; INTRAVENOUS at 06:16

## 2024-12-11 RX ADMIN — SODIUM CHLORIDE: 9 INJECTION, SOLUTION INTRAVENOUS at 01:33

## 2024-12-11 RX ADMIN — ONDANSETRON 4 MG: 2 INJECTION INTRAMUSCULAR; INTRAVENOUS at 21:53

## 2024-12-11 RX ADMIN — PANTOPRAZOLE SODIUM 40 MG: 40 INJECTION, POWDER, FOR SOLUTION INTRAVENOUS at 08:48

## 2024-12-11 RX ADMIN — DEXAMETHASONE SODIUM PHOSPHATE 4 MG: 4 INJECTION INTRA-ARTICULAR; INTRALESIONAL; INTRAMUSCULAR; INTRAVENOUS; SOFT TISSUE at 17:54

## 2024-12-11 RX ADMIN — HYDROMORPHONE HYDROCHLORIDE 1 MG: 1 INJECTION, SOLUTION INTRAMUSCULAR; INTRAVENOUS; SUBCUTANEOUS at 23:34

## 2024-12-11 RX ADMIN — SODIUM CHLORIDE, PRESERVATIVE FREE 10 ML: 5 INJECTION INTRAVENOUS at 21:53

## 2024-12-11 RX ADMIN — HEPARIN SODIUM 5000 UNITS: 5000 INJECTION INTRAVENOUS; SUBCUTANEOUS at 21:53

## 2024-12-11 RX ADMIN — METOCLOPRAMIDE 7.5 MG: 5 INJECTION, SOLUTION INTRAMUSCULAR; INTRAVENOUS at 17:54

## 2024-12-11 ASSESSMENT — PAIN DESCRIPTION - ORIENTATION: ORIENTATION: ANTERIOR

## 2024-12-11 ASSESSMENT — PAIN DESCRIPTION - LOCATION: LOCATION: CHEST;GENERALIZED

## 2024-12-11 ASSESSMENT — PAIN DESCRIPTION - DESCRIPTORS: DESCRIPTORS: ACHING;DISCOMFORT

## 2024-12-11 ASSESSMENT — PAIN - FUNCTIONAL ASSESSMENT: PAIN_FUNCTIONAL_ASSESSMENT: PREVENTS OR INTERFERES SOME ACTIVE ACTIVITIES AND ADLS

## 2024-12-11 ASSESSMENT — PAIN SCALES - GENERAL: PAINLEVEL_OUTOF10: 8

## 2024-12-11 NOTE — PROGRESS NOTES
Physician Progress Note      PATIENT:               ANDERSON MAIER  [facility] #:                  [id number]  :                       1950  ADMIT DATE:       2024 11:53 AM  DISCH DATE:  RESPONDING  PROVIDER #:        Elliott Gutierrez MD          QUERY TEXT:    Patient admitted with SBO. Noted to have  severe  malnutrition on  eval     12/10.  If possible, please document in progress notes and discharge summary   if you are evaluating and /or treating any of the following:    The medical record reflects the following:  Risk Factors: pt was dx with lung cancer   2 months ago and weighed 138#. The radiation therapy has had effects on his   throat that make it very difficult for him to swallow and consequently he has   been eating much less than normal. She reports his lowest weight being 83#   1 month ago but has gained some weight back by drinking Ensures.  Clinical Indicators:   Severe malnutrition (12/10/24 1146)  Context:  Chronic Illness  Findings of the 6 clinical characteristics of malnutrition:  Energy Intake:  75% or less estimated energy requirements for 1 month or   longer  Weight Loss:  Greater than 7.5% over 3 months  Body Fat Loss:  Severe body fat loss Orbital, Triceps, Buccal region  Muscle Mass Loss:  Severe muscle mass loss Temples (temporalis), Clavicles   (pectoralis & deltoids), Calf (gastrocnemius)  Fluid Accumulation:  Unable to assess   Strength:  Not Performed    Treatment: 1. NPO, begin Regular diet when appropriate  2. NPO, add Ensure Plus TID when appropriate      ASPEN Criteria:    https://aspenjournals.onlinelibrary.graff.com/doi/full/10.1177/429828316247614  5    Thank you  very  much  Options provided:  -- Protein calorie malnutrition severe  -- Other - I will add my own diagnosis  -- Disagree - Not applicable / Not valid  -- Disagree - Clinically unable to determine / Unknown  -- Refer to Clinical Documentation Reviewer    PROVIDER RESPONSE TEXT:    This patient has severe  protein calorie malnutrition.    Query created by: Victoria Pride on 12/10/2024 3:02 PM      Electronically signed by:  Elliott Gutierrez MD 12/11/2024 9:42 AM

## 2024-12-11 NOTE — WOUND CARE
WOCN Note:     New consult placed for coccyx and spine.    Assessed in  with Catrachita HILL.    Anthony Coffey is a 74 y.o. y/o male who presented for Small bowel obstruction (HCC) [K56.609]  Constipation, unspecified constipation type [K59.00]  Nausea and vomiting, unspecified vomiting type [R11.2]  Admitted on 2024; LOS: 2    Past Medical History:   Diagnosis Date    Cancer (HCC)     SKIN ON BACK-BASAL CELL    COPD (chronic obstructive pulmonary disease) (HCC)     Full dentures     Hypercholesteremia     Hypertension     Right inguinal hernia 3/24/2021     Lab Results   Component Value Date/Time    WBC 5.0 2024 07:01 AM        Tobacco Use      Smoking status: Some Days        Packs/day: 1.00        Years: 1 pack/day for 40.3 years (40.3 ttl pk-yrs)        Types: Cigarettes        Start date: 1984      Smokeless tobacco: Never      Tobacco comments: Quit smokin-2 EVERY FEW DAYS (WEARS PATCH)     Diet NPO     Assessment:   Patient is A&O x 3, communicative and mobile..    Bed: foam mattress  Patient reports no pain.   Heels offloaded with pillows.  Heels intact without erythema.        Wound Assessment  POA Back, abrasion: 0.5 x  0.5 x 0.1 cm; 100% red; no drainage or erythema.  TX:  cleansed with saline; applied triad wound cream and covered with foam dressing.        2.  POA Sacral/coccyx, pressure injury stage 3:  0.8 x 0.8 x 0.3 cm; 50% red 50% yellow; no drainage or odor.  No erythema.  Tx:  cleansed with saline; applied triad wound cream and covered with foam dressing.        Wound, Pressure Prevention & Skin Care Recommendations:    Minimize layers of linen/pads under patient to optimize support surface.    2.  Turn/reposition approximately every 2 hours and offload heels.   3.  Manage moisture/ Keep skin folds clean and dry/minimize brief usage.  4.  Specialty bed: immerse low air loss ordered. Confirmation 113004. Use only flat sheet and one incontinence pad.  5.  Sacrum:  Daily cleanse

## 2024-12-11 NOTE — CONSULTS
HEMATOLOGY / ONCOLOGY    Anthony Coffey 1950 Age: 74 y.o.  Date of service: 01/23/20   Location: [facility]  Adan Fong MD        INTERIM HISTORY AND ASSESSMENT  Mr. Coffey is a patient of Dr. Freeman at [facility] with a 10 cm WU mass and no known metastases. He is malnourished and dehydrated, but he finished 7 weeks of antibiotics and almost a complete course of radiation. He is due to start durvalumab in early January of 2025. He was admitted for profound weakness. He accidentally ate today and vomited; he had some ealier vomiting at home.     His CT shows multiple air fluid levels, but his exam is not consistent with obstruction. His abdomen is scaphoid, soft, non-tender. I think he has an ileus.   He has very poor skin turgor. He refuses an NGT, so I would just continue IV fluids, perhaps with IV thiamine and other vitamins, trying to slowly advanced his diet over the next few days.   He has potentially curable disease, even though he is in no way a candidate for surgery.   He has esophagitis from his chemoradiation, but he declines oral topical analgesics or IV pain medication. I will intensify his anti emetics        Recent Labs     12/09/24  1202 12/10/24  0821 12/11/24  0701 12/11/24  1022   WBC 3.2* 4.6 5.0  --    HGB 13.3 11.4* 10.5*  --     236 217  --     143  --  143   K 4.8 5.0  --  4.6   ALT 19 15  --  13   MG 1.9 1.9  --   --    PHOS  --  5.2*  --   --         Past Medical History:   Diagnosis Date    Cancer (HCC)     SKIN ON BACK-BASAL CELL    COPD (chronic obstructive pulmonary disease) (HCC)     Full dentures     Hypercholesteremia     Hypertension     Right inguinal hernia 3/24/2021     Past Surgical History:   Procedure Laterality Date    APPENDECTOMY      HERNIA REPAIR Right 04/16/2021    Right inguinal hernia repair. Dr. Barrientos     ORTHOPEDIC SURGERY Right     SURGERY ON THUMB     Medications and Allergies have been reviewed and updated in the [facility] system.    REVIEW OF

## 2024-12-11 NOTE — PROGRESS NOTES
[facility]   Daily Progress Note    Admit Date: 2024  Post-Operative Day: * No surgery found * from * No surgery found *     Subjective:     Last 24 hrs: pt is having some vomiting of stool-like substance, sm amts at a time.  No flatus today   Xray shows non-obstructive bowel-gas pattern    Objective:     Blood pressure 130/86, pulse (!) 109, temperature 98.7 °F (37.1 °C), temperature source Oral, resp. rate 16, height 1.676 m (5' 6\"), weight 41.3 kg (91 lb 0.8 oz), SpO2 95%.  Temp (24hrs), Av.1 °F (36.7 °C), Min:97.7 °F (36.5 °C), Max:98.7 °F (37.1 °C)      _____________________  Physical Exam:     Alert and Oriented, x3, in no acute distress.  Cardiovascular: RRR, no peripheral edema  Abdomen: flat, few BS      Assessment:   Principal Problem:    Small bowel obstruction (HCC)  Active Problems:    Severe malnutrition (HCC)  Resolved Problems:    * No resolved hospital problems. *          Plan:     Cont ice chips  Repeat kub tomorrow  PPI    Data Review:    Recent Labs     24  1202 12/10/24  0821 24  0701   WBC 3.2* 4.6 5.0   HGB 13.3 11.4* 10.5*   HCT 39.7 34.7* 31.7*    236 217     Recent Labs     24  1202 12/10/24  0821 24  1022    143 143   K 4.8 5.0 4.6    112* 112*   CO2 26 21 19*   BUN 28* 39* 45*   MG 1.9 1.9  --    PHOS  --  5.2*  --    ALT 19 15 13     Invalid input(s): \"AML\", \"LPSE\"        ______________________  Medications:    Current Facility-Administered Medications   Medication Dose Route Frequency    lactated ringers infusion   IntraVENous Once    sodium chloride flush 0.9 % injection 5-40 mL  5-40 mL IntraVENous 2 times per day    sodium chloride flush 0.9 % injection 5-40 mL  5-40 mL IntraVENous PRN    0.9 % sodium chloride infusion   IntraVENous PRN    heparin (porcine) injection 5,000 Units  5,000 Units SubCUTAneous BID    ondansetron (ZOFRAN-ODT) disintegrating tablet 4 mg  4 mg Oral Q8H PRN    Or    ondansetron (ZOFRAN) injection 4  mg  4 mg IntraVENous Q6H PRN    polyethylene glycol (GLYCOLAX) packet 17 g  17 g Oral Daily PRN    acetaminophen (TYLENOL) tablet 650 mg  650 mg Oral Q6H PRN    Or    acetaminophen (TYLENOL) suppository 650 mg  650 mg Rectal Q6H PRN    pantoprazole (PROTONIX) 40 mg in sodium chloride (PF) 0.9 % 10 mL injection  40 mg IntraVENous Daily    HYDROmorphone HCl PF (DILAUDID) injection 1 mg  1 mg IntraVENous Q4H PRN    ipratropium 0.5 mg-albuterol 2.5 mg (DUONEB) nebulizer solution 1 Dose  1 Dose Inhalation Q4H PRN    metoclopramide (REGLAN) injection 7.5 mg  7.5 mg IntraVENous Q6H    0.9 % sodium chloride infusion   IntraVENous Continuous       MILLER Kingston - NP  12/11/2024      ADDENDUM:  Hunter Freed MD  PT was seen and examined.  Pt reported nausea and vomiting most of the night.  KUB showed non-obstructive pattern.  Discussed with patient about NG tube placement but he is declining due to difficult attempted insertion during his admission.  Discussed with patient about PEG placement and he seemed to be open to it if nausea and vomiting persist.  No acute indication for operation at this time.  Continue reglan and bowel regiment.  NPO with ice chips for now.    FACE TO FACE time including review of any indicated imaging, discussion with patient, and other providers, exam and discussion with patient: 20 minutes

## 2024-12-11 NOTE — PROGRESS NOTES
[facility] Adult  Hospitalist Group                                                                                          Hospitalist Progress Note  MILLER Nunez NP  Office Phone: [phone]        Date of Service:  2024  NAME:  Anthony Coffey  :  1950  MRN:  [id number]       Admission Summary:   Anthony Coffey is a 74 y.o. male with past medical history significant for COPD, hypertension, dyslipidemia, lung cancer on radiation therapy [facility] with weakness.  Patient reported weakness or generalized nonfocal weakness.  Patient also reported decreased appetite as well as nausea and vomiting.  Patient has not been eating or drinking as a result of his illness.  Patient symptoms started a couple of days ago and progressively getting worse.  His wife reported small amount of blood in the emesis in the morning.  He has not been able to go for his radiation therapy because of his illness.  CT scan of the abdomen and pelvis without contrast done on arrival at the emergency room shows findings suggestive of small bowel obstruction.  Patient was unable to tolerate NG tube placement in the emergency room.  He was referred to the hospitalist for admission.     Interval history / Subjective:     Patient seen and examined. Wife is distressed about the fact that he is vomiting up green/yellow bile. I observed, he is more like \"spitting up\" and small amounts. Repeat KUB today shows no obstructive pattern. Will hold off on NGT today, continue with bowel rest. Repeat KUB tomorrow.     Assessment & Plan:     Small bowel obstruction  -N.p.o. for bowel rest, okay for ice chips  - repeat KUB with no obstruction/ileus today   -Supplemental IV fluids  -General Surgery following     Tachycardia  - ongoing   - EKG shows ST/T wave changes  - troponin ~ 300   - repeat trop and cardiology consult  - ECHO ordered  - cardiology does not feel further  Sexual abuse: Denies   Utilities: Not on file       Review of Systems:   Pertinent items are noted in HPI.       Vital Signs:    Last 24hrs VS reviewed since prior progress note. Most recent are:  Vitals:    12/11/24 1508   BP: 133/81   Pulse: (!) 116   Resp: 16   Temp: 98 °F (36.7 °C)   SpO2: 95%       No intake or output data in the 24 hours ending 12/11/24 1612     Physical Examination:     I had a face to face encounter with this patient and independently examined them on 12/11/2024 as outlined below:          General : alert x 3, awake, conversational, chronically ill-appearing, underweight, frail  HEENT: EOMI, normocephalic, atraumatic, moist mucous membranes   Neck: supple, nontender, no JVD, no masses or swelling   Respiratory: Diminished throughout, no distress, normal resp rate  Cardiovascular: regular rate and rhythm, no murmur appreciated, normal heart sounds   Abd: mild tenderness with palpation, soft, no distention, no bowel sounds heart on exam today   Genitourinary: no perez  Extremities: moves all, normal capillary refill,no noted edema, very thin  Neuro: alert, oriented x 3, normal speech, no obvious motor deficits   Skin: warm, dry, normal color, no rash  Psych: normal affect, normal judgment/insight, normal mood    Data Review:    Review and/or order of clinical lab test      I have personally and independently reviewed all pertinent labs, diagnostic studies, imaging, and have provided independent interpretation of the same.     Labs:     Recent Labs     12/10/24  0821 12/11/24  0701   WBC 4.6 5.0   HGB 11.4* 10.5*   HCT 34.7* 31.7*    217     Recent Labs     12/09/24  1202 12/10/24  0821 12/11/24  1022    143 143   K 4.8 5.0 4.6    112* 112*   CO2 26 21 19*   BUN 28* 39* 45*   MG 1.9 1.9  --    PHOS  --  5.2*  --      Recent Labs     12/09/24  1202 12/10/24  0821 12/11/24  1022   ALT 19 15 13   GLOB 3.4 3.2 2.6     No results for input(s): \"INR\", \"APTT\" in the last 72  mg-albuterol 2.5 mg (DUONEB) nebulizer solution 1 Dose  1 Dose Inhalation Q4H PRN    metoclopramide (REGLAN) injection 7.5 mg  7.5 mg IntraVENous Q6H    0.9 % sodium chloride infusion   IntraVENous Continuous     ______________________________________________________________________  EXPECTED LENGTH OF STAY: 4  ACTUAL LENGTH OF STAY:          2                 MILLER Nunez NP

## 2024-12-11 NOTE — CONSULTS
CARDIOLOGY CONSULT                  Subjective:    Date of  Admission:   Admission type:Emergency    Jose Braswell MD     This is an unfortunate 74 yom undergoing treatment for lung CA who is here with an SBO. Presumably due to sinus tachycardia, a troponin was ordered which was mildly abnormal. He has no cardiac complaints. He did just try to eat something and he threw up bile.    Patient Active Problem List   Diagnosis    Right inguinal hernia    Small bowel obstruction (HCC)    Severe malnutrition (HCC)        Past Medical History:   Diagnosis Date    Cancer (HCC)     SKIN ON BACK-BASAL CELL    COPD (chronic obstructive pulmonary disease) (HCC)     Full dentures     Hypercholesteremia     Hypertension     Right inguinal hernia 3/24/2021      Past Surgical History:   Procedure Laterality Date    APPENDECTOMY      HERNIA REPAIR Right 2021    Right inguinal hernia repair. Dr. Barrientos     ORTHOPEDIC SURGERY Right     SURGERY ON THUMB      Family History   Problem Relation Age of Onset    Cancer Brother         COLON    Anesth Problems Neg Hx     Hypertension Mother       Social History     Socioeconomic History    Marital status:      Spouse name: Not on file    Number of children: Not on file    Years of education: Not on file    Highest education level: Not on file   Occupational History    Not on file   Tobacco Use    Smoking status: Some Days     Current packs/day: 1.00     Average packs/day: 1 pack/day for 40.3 years (40.3 ttl pk-yrs)     Types: Cigarettes     Start date: 1984    Smokeless tobacco: Never    Tobacco comments:     Quit smokin-2 EVERY FEW DAYS (WEARS PATCH)   Substance and Sexual Activity    Alcohol use: Not Currently    Drug use: Never    Sexual activity: Not on file   Other Topics Concern    Not on file   Social History Narrative    Not on file     Social Determinants of Health     Financial Resource Strain: Not on file   Food Insecurity: Not on file    Transportation Needs: Not on file   Physical Activity: Not on file   Stress: Not on file   Social Connections: Not on file   Intimate Partner Violence: Not on file   Housing Stability: Not on file        Current Facility-Administered Medications   Medication Dose Route Frequency    sodium chloride flush 0.9 % injection 5-40 mL  5-40 mL IntraVENous 2 times per day    sodium chloride flush 0.9 % injection 5-40 mL  5-40 mL IntraVENous PRN    0.9 % sodium chloride infusion   IntraVENous PRN    heparin (porcine) injection 5,000 Units  5,000 Units SubCUTAneous BID    ondansetron (ZOFRAN-ODT) disintegrating tablet 4 mg  4 mg Oral Q8H PRN    Or    ondansetron (ZOFRAN) injection 4 mg  4 mg IntraVENous Q6H PRN    polyethylene glycol (GLYCOLAX) packet 17 g  17 g Oral Daily PRN    acetaminophen (TYLENOL) tablet 650 mg  650 mg Oral Q6H PRN    Or    acetaminophen (TYLENOL) suppository 650 mg  650 mg Rectal Q6H PRN    pantoprazole (PROTONIX) 40 mg in sodium chloride (PF) 0.9 % 10 mL injection  40 mg IntraVENous Daily    HYDROmorphone HCl PF (DILAUDID) injection 1 mg  1 mg IntraVENous Q4H PRN    ipratropium 0.5 mg-albuterol 2.5 mg (DUONEB) nebulizer solution 1 Dose  1 Dose Inhalation Q4H PRN    metoclopramide (REGLAN) injection 7.5 mg  7.5 mg IntraVENous Q6H    0.9 % sodium chloride infusion   IntraVENous Continuous             Review of Symptoms:    Gen - no F/C/S  Eyes - no vision changes  ENT - no sore throat, rhinorrhea, otalgia  CV - no CP, no palpitations, no orthopnea, no PND, no MILTON  Resp no cough, no SOB/JORDAN  GI - no AP, no n/v/d/c   - no dysuria, no hematuria  MSK - no abnormal joint pains  Skin - no rashes  Neuro - no HA, no numbness, no weakness, no slurred speech  Psych - no change in mood       Physical Exam    @VS1@     NAD  Skin warm and dry  Nl conjunctiva  Oropharynx without exudate.    Neck supple  Lungs clear  Normal S1/ S2 with occasional ectopy  No Murmurs, click or Rubs  Abdomen soft and non

## 2024-12-12 ENCOUNTER — APPOINTMENT (OUTPATIENT)
Facility: HOSPITAL | Age: 74
DRG: 388 | End: 2024-12-12
Payer: MEDICARE

## 2024-12-12 LAB
ANION GAP SERPL CALC-SCNC: 10 MMOL/L (ref 2–12)
ANION GAP SERPL CALC-SCNC: 13 MMOL/L (ref 2–12)
ARTERIAL PATENCY WRIST A: POSITIVE
ARTERIAL PATENCY WRIST A: YES
BASE DEFICIT BLD-SCNC: 7.1 MMOL/L
BASE DEFICIT BLDA-SCNC: 11.8 MMOL/L
BASOPHILS # BLD: 0 K/UL (ref 0–0.1)
BASOPHILS NFR BLD: 0 % (ref 0–1)
BDY SITE: ABNORMAL
BDY SITE: ABNORMAL
BUN SERPL-MCNC: 61 MG/DL (ref 6–20)
BUN SERPL-MCNC: 66 MG/DL (ref 6–20)
BUN/CREAT SERPL: 17 (ref 12–20)
BUN/CREAT SERPL: 19 (ref 12–20)
CALCIUM SERPL-MCNC: 7.5 MG/DL (ref 8.5–10.1)
CALCIUM SERPL-MCNC: 7.5 MG/DL (ref 8.5–10.1)
CHLORIDE SERPL-SCNC: 115 MMOL/L (ref 97–108)
CHLORIDE SERPL-SCNC: 117 MMOL/L (ref 97–108)
CO2 SERPL-SCNC: 19 MMOL/L (ref 21–32)
CO2 SERPL-SCNC: 20 MMOL/L (ref 21–32)
CREAT SERPL-MCNC: 3.54 MG/DL (ref 0.7–1.3)
CREAT SERPL-MCNC: 3.59 MG/DL (ref 0.7–1.3)
DIFFERENTIAL METHOD BLD: ABNORMAL
ECHO AO ASC DIAM: 3.5 CM
ECHO AO ASCENDING AORTA INDEX: 2.45 CM/M2
ECHO AO ROOT DIAM: 3.5 CM
ECHO AO ROOT INDEX: 2.45 CM/M2
ECHO BSA: 1.39 M2
ECHO LV EF PHYSICIAN: 60 %
ECHO LV FRACTIONAL SHORTENING: 40 % (ref 28–44)
ECHO LV INTERNAL DIMENSION DIASTOLE INDEX: 2.8 CM/M2
ECHO LV INTERNAL DIMENSION DIASTOLIC: 4 CM (ref 4.2–5.9)
ECHO LV INTERNAL DIMENSION SYSTOLIC INDEX: 1.68 CM/M2
ECHO LV INTERNAL DIMENSION SYSTOLIC: 2.4 CM
ECHO LV IVSD: 0.9 CM (ref 0.6–1)
ECHO LV IVSD: 2.5 CM (ref 0.6–1)
ECHO LV POSTERIOR WALL DIASTOLIC: 0.9 CM (ref 0.6–1)
ECHO LV RELATIVE WALL THICKNESS RATIO: 0.45
ECHO LVOT AREA: 3.1 CM2
ECHO LVOT DIAM: 2 CM
ECHO TV REGURGITANT MAX VELOCITY: 2.76 M/S
ECHO TV REGURGITANT PEAK GRADIENT: 31 MMHG
EKG ATRIAL RATE: 128 BPM
EKG ATRIAL RATE: 134 BPM
EKG DIAGNOSIS: NORMAL
EKG DIAGNOSIS: NORMAL
EKG P AXIS: 69 DEGREES
EKG P AXIS: 71 DEGREES
EKG P-R INTERVAL: 122 MS
EKG P-R INTERVAL: 128 MS
EKG Q-T INTERVAL: 312 MS
EKG Q-T INTERVAL: 398 MS
EKG QRS DURATION: 76 MS
EKG QRS DURATION: 80 MS
EKG QTC CALCULATION (BAZETT): 465 MS
EKG QTC CALCULATION (BAZETT): 581 MS
EKG R AXIS: 73 DEGREES
EKG R AXIS: 75 DEGREES
EKG T AXIS: 79 DEGREES
EKG T AXIS: 91 DEGREES
EKG VENTRICULAR RATE: 128 BPM
EKG VENTRICULAR RATE: 134 BPM
EOSINOPHIL # BLD: 0 K/UL (ref 0–0.4)
EOSINOPHIL NFR BLD: 0 % (ref 0–7)
ERYTHROCYTE [DISTWIDTH] IN BLOOD BY AUTOMATED COUNT: 18.2 % (ref 11.5–14.5)
GAS FLOW.O2 O2 DELIVERY SYS: ABNORMAL
GLUCOSE BLD STRIP.AUTO-MCNC: 133 MG/DL (ref 65–117)
GLUCOSE SERPL-MCNC: 119 MG/DL (ref 65–100)
GLUCOSE SERPL-MCNC: 75 MG/DL (ref 65–100)
HCO3 BLD-SCNC: 16 MMOL/L (ref 21–28)
HCO3 BLDA-SCNC: 15 MMOL/L (ref 22–26)
HCT VFR BLD AUTO: 33.9 % (ref 36.6–50.3)
HGB BLD-MCNC: 10.8 G/DL (ref 12.1–17)
IMM GRANULOCYTES # BLD AUTO: 0 K/UL
IMM GRANULOCYTES NFR BLD AUTO: 0 %
LACTATE SERPL-SCNC: 1.5 MMOL/L (ref 0.4–2)
LACTATE SERPL-SCNC: 5.6 MMOL/L (ref 0.4–2)
LYMPHOCYTES # BLD: 0.4 K/UL (ref 0.8–3.5)
LYMPHOCYTES NFR BLD: 5 % (ref 12–49)
MCH RBC QN AUTO: 30.6 PG (ref 26–34)
MCHC RBC AUTO-ENTMCNC: 31.9 G/DL (ref 30–36.5)
MCV RBC AUTO: 96 FL (ref 80–99)
MONOCYTES # BLD: 0.7 K/UL (ref 0–1)
MONOCYTES NFR BLD: 9 % (ref 5–13)
NEUTS BAND NFR BLD MANUAL: 2 % (ref 0–6)
NEUTS SEG # BLD: 6.7 K/UL (ref 1.8–8)
NEUTS SEG NFR BLD: 84 % (ref 32–75)
NRBC # BLD: 0.03 K/UL (ref 0–0.01)
NRBC BLD-RTO: 0.4 PER 100 WBC
PCO2 BLD: 24.7 MMHG (ref 35–48)
PCO2 BLDA: 37 MMHG (ref 35–45)
PH BLD: 7.42 (ref 7.35–7.45)
PH BLDA: 7.23 (ref 7.35–7.45)
PLATELET # BLD AUTO: 179 K/UL (ref 150–400)
PMV BLD AUTO: 9 FL (ref 8.9–12.9)
PO2 BLD: 62 MMHG (ref 83–108)
PO2 BLDA: 69 MMHG (ref 80–100)
POTASSIUM SERPL-SCNC: 4.7 MMOL/L (ref 3.5–5.1)
POTASSIUM SERPL-SCNC: 4.7 MMOL/L (ref 3.5–5.1)
PROCALCITONIN SERPL-MCNC: 20.66 NG/ML
RBC # BLD AUTO: 3.53 M/UL (ref 4.1–5.7)
RBC MORPH BLD: ABNORMAL
SAO2 % BLD: 91 % (ref 92–97)
SAO2 % BLD: 92.4 % (ref 92–97)
SAO2% DEVICE SAO2% SENSOR NAME: ABNORMAL
SERVICE CMNT-IMP: ABNORMAL
SERVICE CMNT-IMP: ABNORMAL
SODIUM SERPL-SCNC: 147 MMOL/L (ref 136–145)
SODIUM SERPL-SCNC: 147 MMOL/L (ref 136–145)
SPECIMEN SITE: ABNORMAL
SPECIMEN TYPE: ABNORMAL
TROPONIN I SERPL HS-MCNC: 241 NG/L (ref 0–76)
WBC # BLD AUTO: 7.8 K/UL (ref 4.1–11.1)

## 2024-12-12 PROCEDURE — 3E033XZ INTRODUCTION OF VASOPRESSOR INTO PERIPHERAL VEIN, PERCUTANEOUS APPROACH: ICD-10-PCS | Performed by: INTERNAL MEDICINE

## 2024-12-12 PROCEDURE — 85025 COMPLETE CBC W/AUTO DIFF WBC: CPT

## 2024-12-12 PROCEDURE — 5A0945A ASSISTANCE WITH RESPIRATORY VENTILATION, 24-96 CONSECUTIVE HOURS, HIGH NASAL FLOW/VELOCITY: ICD-10-PCS | Performed by: INTERNAL MEDICINE

## 2024-12-12 PROCEDURE — 2580000003 HC RX 258: Performed by: INTERNAL MEDICINE

## 2024-12-12 PROCEDURE — 6360000002 HC RX W HCPCS: Performed by: INTERNAL MEDICINE

## 2024-12-12 PROCEDURE — 2000000000 HC ICU R&B

## 2024-12-12 PROCEDURE — 36415 COLL VENOUS BLD VENIPUNCTURE: CPT

## 2024-12-12 PROCEDURE — 83605 ASSAY OF LACTIC ACID: CPT

## 2024-12-12 PROCEDURE — 2700000000 HC OXYGEN THERAPY PER DAY

## 2024-12-12 PROCEDURE — 2580000003 HC RX 258: Performed by: NURSE PRACTITIONER

## 2024-12-12 PROCEDURE — 6370000000 HC RX 637 (ALT 250 FOR IP): Performed by: INTERNAL MEDICINE

## 2024-12-12 PROCEDURE — 6360000002 HC RX W HCPCS

## 2024-12-12 PROCEDURE — 74018 RADEX ABDOMEN 1 VIEW: CPT

## 2024-12-12 PROCEDURE — 2500000003 HC RX 250 WO HCPCS: Performed by: INTERNAL MEDICINE

## 2024-12-12 PROCEDURE — 99231 SBSQ HOSP IP/OBS SF/LOW 25: CPT | Performed by: NURSE PRACTITIONER

## 2024-12-12 PROCEDURE — 36600 WITHDRAWAL OF ARTERIAL BLOOD: CPT

## 2024-12-12 PROCEDURE — 74176 CT ABD & PELVIS W/O CONTRAST: CPT

## 2024-12-12 PROCEDURE — 51798 US URINE CAPACITY MEASURE: CPT

## 2024-12-12 PROCEDURE — 94760 N-INVAS EAR/PLS OXIMETRY 1: CPT

## 2024-12-12 PROCEDURE — 84484 ASSAY OF TROPONIN QUANT: CPT

## 2024-12-12 PROCEDURE — 99223 1ST HOSP IP/OBS HIGH 75: CPT | Performed by: NURSE PRACTITIONER

## 2024-12-12 PROCEDURE — 82962 GLUCOSE BLOOD TEST: CPT

## 2024-12-12 PROCEDURE — 6360000002 HC RX W HCPCS: Performed by: SURGERY

## 2024-12-12 PROCEDURE — 80048 BASIC METABOLIC PNL TOTAL CA: CPT

## 2024-12-12 PROCEDURE — 93005 ELECTROCARDIOGRAM TRACING: CPT | Performed by: NURSE PRACTITIONER

## 2024-12-12 PROCEDURE — 84145 PROCALCITONIN (PCT): CPT

## 2024-12-12 PROCEDURE — 82803 BLOOD GASES ANY COMBINATION: CPT

## 2024-12-12 PROCEDURE — 6360000002 HC RX W HCPCS: Performed by: NURSE PRACTITIONER

## 2024-12-12 PROCEDURE — 0D9670Z DRAINAGE OF STOMACH WITH DRAINAGE DEVICE, VIA NATURAL OR ARTIFICIAL OPENING: ICD-10-PCS | Performed by: SURGERY

## 2024-12-12 PROCEDURE — 2500000003 HC RX 250 WO HCPCS

## 2024-12-12 RX ORDER — LIDOCAINE HYDROCHLORIDE 20 MG/ML
JELLY TOPICAL ONCE
Status: DISCONTINUED | OUTPATIENT
Start: 2024-12-12 | End: 2024-12-12 | Stop reason: SDUPTHER

## 2024-12-12 RX ORDER — LIDOCAINE HYDROCHLORIDE 20 MG/ML
JELLY TOPICAL ONCE
Status: DISCONTINUED | OUTPATIENT
Start: 2024-12-12 | End: 2024-12-13

## 2024-12-12 RX ORDER — BISACODYL 10 MG
10 SUPPOSITORY, RECTAL RECTAL DAILY PRN
Status: DISCONTINUED | OUTPATIENT
Start: 2024-12-12 | End: 2024-12-13 | Stop reason: HOSPADM

## 2024-12-12 RX ORDER — ALBUMIN HUMAN 50 G/1000ML
25 SOLUTION INTRAVENOUS ONCE
Status: DISCONTINUED | OUTPATIENT
Start: 2024-12-12 | End: 2024-12-12

## 2024-12-12 RX ORDER — 0.9 % SODIUM CHLORIDE 0.9 %
500 INTRAVENOUS SOLUTION INTRAVENOUS ONCE
Status: COMPLETED | OUTPATIENT
Start: 2024-12-12 | End: 2024-12-12

## 2024-12-12 RX ORDER — LACTULOSE 10 G/15ML
30 SOLUTION ORAL DAILY
Status: DISCONTINUED | OUTPATIENT
Start: 2024-12-12 | End: 2024-12-13

## 2024-12-12 RX ORDER — NALOXONE HYDROCHLORIDE 0.4 MG/ML
0.2 INJECTION, SOLUTION INTRAMUSCULAR; INTRAVENOUS; SUBCUTANEOUS PRN
Status: DISCONTINUED | OUTPATIENT
Start: 2024-12-12 | End: 2024-12-13 | Stop reason: HOSPADM

## 2024-12-12 RX ORDER — NOREPINEPHRINE BITARTRATE 0.06 MG/ML
.5-2 INJECTION, SOLUTION INTRAVENOUS CONTINUOUS
Status: DISCONTINUED | OUTPATIENT
Start: 2024-12-12 | End: 2024-12-13

## 2024-12-12 RX ORDER — OXYMETAZOLINE HYDROCHLORIDE 0.05 G/100ML
2 SPRAY NASAL ONCE
Status: COMPLETED | OUTPATIENT
Start: 2024-12-12 | End: 2024-12-12

## 2024-12-12 RX ORDER — NOREPINEPHRINE BITARTRATE 0.06 MG/ML
INJECTION, SOLUTION INTRAVENOUS
Status: COMPLETED
Start: 2024-12-12 | End: 2024-12-12

## 2024-12-12 RX ORDER — NALOXONE HYDROCHLORIDE 0.4 MG/ML
INJECTION, SOLUTION INTRAMUSCULAR; INTRAVENOUS; SUBCUTANEOUS
Status: COMPLETED
Start: 2024-12-12 | End: 2024-12-12

## 2024-12-12 RX ORDER — ALBUMIN HUMAN 50 G/1000ML
12.5 SOLUTION INTRAVENOUS ONCE
Status: DISCONTINUED | OUTPATIENT
Start: 2024-12-12 | End: 2024-12-12

## 2024-12-12 RX ORDER — SODIUM CHLORIDE, SODIUM LACTATE, POTASSIUM CHLORIDE, AND CALCIUM CHLORIDE .6; .31; .03; .02 G/100ML; G/100ML; G/100ML; G/100ML
1000 INJECTION, SOLUTION INTRAVENOUS ONCE
Status: COMPLETED | OUTPATIENT
Start: 2024-12-12 | End: 2024-12-12

## 2024-12-12 RX ADMIN — ONDANSETRON 4 MG: 2 INJECTION INTRAMUSCULAR; INTRAVENOUS at 08:28

## 2024-12-12 RX ADMIN — LACTULOSE 30 G: 20 SOLUTION ORAL at 18:38

## 2024-12-12 RX ADMIN — BISACODYL 10 MG: 10 SUPPOSITORY RECTAL at 18:20

## 2024-12-12 RX ADMIN — SODIUM CHLORIDE 500 ML: 9 INJECTION, SOLUTION INTRAVENOUS at 06:02

## 2024-12-12 RX ADMIN — SODIUM CHLORIDE: 9 INJECTION, SOLUTION INTRAVENOUS at 14:26

## 2024-12-12 RX ADMIN — NALXONE HYDROCHLORIDE 0.4 MG: 0.4 INJECTION INTRAMUSCULAR; INTRAVENOUS; SUBCUTANEOUS at 06:09

## 2024-12-12 RX ADMIN — NOREPINEPHRINE BITARTRATE 5 MCG/MIN: 0.06 INJECTION, SOLUTION INTRAVENOUS at 08:45

## 2024-12-12 RX ADMIN — PANTOPRAZOLE SODIUM 40 MG: 40 INJECTION, POWDER, LYOPHILIZED, FOR SOLUTION INTRAVENOUS at 20:37

## 2024-12-12 RX ADMIN — SODIUM CHLORIDE, POTASSIUM CHLORIDE, SODIUM LACTATE AND CALCIUM CHLORIDE 1000 ML: 600; 310; 30; 20 INJECTION, SOLUTION INTRAVENOUS at 12:30

## 2024-12-12 RX ADMIN — DEXAMETHASONE SODIUM PHOSPHATE 4 MG: 4 INJECTION INTRA-ARTICULAR; INTRALESIONAL; INTRAMUSCULAR; INTRAVENOUS; SOFT TISSUE at 04:37

## 2024-12-12 RX ADMIN — HEPARIN SODIUM 5000 UNITS: 5000 INJECTION INTRAVENOUS; SUBCUTANEOUS at 20:54

## 2024-12-12 RX ADMIN — NOREPINEPHRINE BITARTRATE 15 MCG/MIN: 0.06 INJECTION, SOLUTION INTRAVENOUS at 11:19

## 2024-12-12 RX ADMIN — WATER 50 MG: 1 INJECTION INTRAMUSCULAR; INTRAVENOUS; SUBCUTANEOUS at 20:37

## 2024-12-12 RX ADMIN — METOCLOPRAMIDE 7.5 MG: 5 INJECTION, SOLUTION INTRAMUSCULAR; INTRAVENOUS at 01:56

## 2024-12-12 RX ADMIN — WATER 50 MG: 1 INJECTION INTRAMUSCULAR; INTRAVENOUS; SUBCUTANEOUS at 14:44

## 2024-12-12 RX ADMIN — METOCLOPRAMIDE 7.5 MG: 5 INJECTION, SOLUTION INTRAMUSCULAR; INTRAVENOUS at 06:46

## 2024-12-12 RX ADMIN — PIPERACILLIN AND TAZOBACTAM 3375 MG: 3; .375 INJECTION, POWDER, LYOPHILIZED, FOR SOLUTION INTRAVENOUS at 18:19

## 2024-12-12 RX ADMIN — PIPERACILLIN AND TAZOBACTAM 4500 MG: 4; .5 INJECTION, POWDER, LYOPHILIZED, FOR SOLUTION INTRAVENOUS; PARENTERAL at 12:50

## 2024-12-12 RX ADMIN — SODIUM CHLORIDE: 9 INJECTION, SOLUTION INTRAVENOUS at 23:54

## 2024-12-12 RX ADMIN — SODIUM CHLORIDE, PRESERVATIVE FREE 10 ML: 5 INJECTION INTRAVENOUS at 21:01

## 2024-12-12 RX ADMIN — OXYMETAZOLINE HYDROCHLORIDE 2 SPRAY: 0.5 SPRAY NASAL at 12:38

## 2024-12-12 RX ADMIN — HYDROMORPHONE HYDROCHLORIDE 1 MG: 1 INJECTION, SOLUTION INTRAMUSCULAR; INTRAVENOUS; SUBCUTANEOUS at 05:36

## 2024-12-12 ASSESSMENT — ENCOUNTER SYMPTOMS
EYES NEGATIVE: 1
ABDOMINAL DISTENTION: 1
ALLERGIC/IMMUNOLOGIC NEGATIVE: 1
COUGH: 1
NAUSEA: 1

## 2024-12-12 ASSESSMENT — PAIN DESCRIPTION - DESCRIPTORS: DESCRIPTORS: ACHING;DISCOMFORT

## 2024-12-12 ASSESSMENT — PAIN SCALES - GENERAL
PAINLEVEL_OUTOF10: 8
PAINLEVEL_OUTOF10: 0

## 2024-12-12 ASSESSMENT — PAIN - FUNCTIONAL ASSESSMENT: PAIN_FUNCTIONAL_ASSESSMENT: PREVENTS OR INTERFERES SOME ACTIVE ACTIVITIES AND ADLS

## 2024-12-12 ASSESSMENT — PAIN DESCRIPTION - LOCATION: LOCATION: GENERALIZED

## 2024-12-12 NOTE — PROGRESS NOTES
Rec'd patient from 5S awake and alert, able to answer questions.  Skin dry/cool to touch. Patient noted to be very thin.  Levophed infusing at present. Via L PIV    Right chest port accessed with a 20ga needle.  + blood returned noted.    Patient placed on HFN 30L 89% tolerated if fairly well at times.      NGT placed noted 1600 cc of brown bad smelling liquid.  Family at bedside.  NGT pulled out by patient.      Voided 100cc of yellow urine bladder scan done > 142 noted @ 1700  8fr dobhoff placed.  Tolerated it well.    Made comfortable at present.  Tolerated small amt of ice chips.     Remain on Leveo weaning as ordered.

## 2024-12-12 NOTE — PROGRESS NOTES
Hospitalist Night Cover     Name: Anthony Coffey  YOB: 1950      Overnight update:        Anthony Coffey is a 73yo with a pmhx of COPD, HYN, HLD and lung ca who is admitted to room 553 with SBO, tachycardia.     12/11 2300: Seen patient at bedside. He states that he is having abdominal pain with nausea. Denies any new shortness of breath and/or chest pain. Upset that EKG was done. Stating that he does not want any more blood work to be done or any further tests. Only is trying to \"stay alive for my wife\". Trop. Discontinued.     0600 Rapid response called for unresponsiveness and hypotension  Seen and examined patient. Will briefly wake up to verbal stimuli and fall back to sleep. Recently given dilaudid. Per RN patient awake and alert prior to dilaudid administration.   SBP 70's NS 500ml bolus ongoing. ABG done   Narcan given. Patient woke up following narcan administration. States that we keep doing things to him and he just wants to \"lay here and die\". Refused lab draw.   Dilaudid placed on hold. Will consult palliative for GOC discussion with patient and wife.      Blanka Fish [facility]-NP

## 2024-12-12 NOTE — PROGRESS NOTES
0555- Rapid called to patient's room , high rate in 149-150 .Patient drowsy and sleepy  wakens to voice  Blood pressure

## 2024-12-12 NOTE — PROGRESS NOTES
Critical Care Documentation    Name: Anthony Coffey  YOB: 1950  MRN: [id number]  Admission Date: 12/9/2024 11:53 AM    Date of service: 12/12/2024    Active Diagnoses:    Patient with PMHx of COPD, HTN, HLD, and lung cancer being actively treated with chemo/radiation who presented to the hospital with n/v / weakness and inability to eat x 4 days. CT of abdomen and pelvis without contrast with findings suggestive of SBO. ER was unable to place NG tube due to extreme esophageal pain ( patient with radiation recently). He has been treated conservatively this admission thus far for SBO.    Chief Complaint:    Rapid Response this AM at 0558 AM called for hypotension and AMS. Increased lethargy. I was present at the bedside at 0800 AM after call from rapid response RN stating patients BP still remains low despite IV fluids.     Clinical Presentation:    BP systolic 60's. HR ranging 120-130 , sinus tachycardia per EKG reviewed. Patient lethargic, responds to verbal stimuli. Appears gray/ashen in color. Total fluids received in the last 15 hours = 2.5 -- between 1.5L of bolus ordered and hourly maintenance fluids ( which are not accurately documented but were given per nursing). Patient is echoing thoughts of impending doom. He initially refused labs this morning during RRT, but has now agreed to have them.     Physical Exam:     General : alert x 3, awake, lethargic, pallor present, chronically ill-appearing, underweight, frail  HEENT: EOMI, normocephalic, atraumatic, moist mucous membranes   Neck: supple, nontender, no JVD, no masses or swelling   Respiratory: Diminished throughout, no distress, normal resp rate, mildly labored breathing   Cardiovascular: regular rate and rhythm, no murmur appreciated, normal heart sounds   Abd: mild tenderness with palpation, soft, no distention, no bowel sounds heart on exam today   Genitourinary: no perez  Extremities: moves all, normal capillary refill,no noted edema,  very thin  Neuro: alert, oriented x 3, normal speech, no obvious motor deficits   Skin: warm, dry, gray/ashen, no rash       Data Reviewed:   All diagnostic labs and studies have been reviewed.    Assessment and Plan:    Hypotension refractory to fluids   Presumed SBO  -  Lactic acid/ procal/ CBC/CMP/troponin/ABG ordered  - stat CT scan abdomen/pelvis WO   - initiate Levophed peripherally while awaiting ICU bed   - stress dose steroids started   - code status discussed with Dr. Torres and patient/wife, decision made for DNR ( see Dr. Torres note for more details)   - transfer to ICU for higher level of care                           Medications Administered:   Sedation: [ ] yes [ x] no   Anxiolytics: [ ] yes [x ] no   Antiarrhythmics: [ ] yes [ x] no   Antihypertensives: [ ] yes [ x] no   Pressors: [ x] yes [ ] no   IVF's: [x ] yes [ ] no       Critical Care Attestation:  This patient is unstable and critically ill. Due to a high probability of clinically significant, life threatening deterioration, the patient required my highest level of preparedness to intervene emergently and I personally spent this critical care time directly and personally managing the patient. This critical care time included obtaining a history; examining the patient; pulse oximetry; ordering and review of studies; arranging urgent treatment with development of a management plan; evaluation of patient's response to treatment; frequent reassessment; and, discussions with other providers and/or family. This critical care time was performed to assess and manage the high probability of imminent, life-threatening deterioration that could result in multi-organ failure and death. It was exclusive of separately billable procedures, treating other patients, and teaching time.      Time Spent:     I personally spent 60 minutes in providing critical care time.    MILLER Nunez NP  12/12/2024  9:19 AM

## 2024-12-12 NOTE — PROGRESS NOTES
0555 Rapid response called to room , patient drowsy awakens to voice,  BP 67/45 Heart rate 149 ,  ,NP came in  ordered  narcan 0.2 mg, CBC with Diff,BMP,Troponin, blood gas arteral, inpatient consult to palliative care, held dilaudid , bolus ns 5ooml was ordered and given BP came up to 87/67, Albumin 5% IV solution  was ordered but then it was d/c , currently patient alert and verbal refused labs to be drawn,states he is tired of being stuck .

## 2024-12-12 NOTE — PROGRESS NOTES
0800 Pt had RRT earlier this morning.  Checked on patient this morning and he was found to be hypotensive.  Hospitalist paged and came to bedside.  Intensivist consulted and Levophed started.  Pt transferred to ICU

## 2024-12-12 NOTE — CONSULTS
grandsons who are all local. 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 Is of Jewish miladys.       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 Patient appears extremely frail, malnourished, and lethargic. Now on HFNC (80 L, 30%), with NG tube placed. Unable to communicate outside of 1-2 words, falls asleep in mid conversation, and unable to participate in medical decision making.   Spouse, Diana at bedside. Palliative services introduced to provide support with goals of care, symptom management. Diana provided interval history of his overall status the last few weeks, as well as acute events precipitating admission.   Lung cancer; WU mass --   Management per [facility], seen yesterday (12/11)   Had almost completed complete course of radiation, had two sessions left. Was also receiving chemotherapy.   Deemed not a surgical candidate, although onc team saying he has potentially curable disease.   Hypotension --   Suspected bacteremia (lactate 5.6)  Refractory to IV fluid boluses and continuous fluids   Levophed gtt started this morning during RRT, currently on 11 mcg/min  Nausea and vomiting, secondary to presumed SBO -- resolving   Imaging not remarkable for obstruction, however pt allowed NG tube to be placed this morning for decompression. Spoke with bedside nursing, 1600 ml removed this morning of feculent appearing liquid.   Zofran IV 4 mg on a scheduled basis, BID. Also available every four hours as needed. Required x1 last evening.   Dysphagia, secondary to radiation esophagitis --   Unable to tolerate anything other than soft textured foods at home. Causing significant weight loss within the last several weeks. Has been trying to  Normal appearance  [x] Distended  [] Ascites  [] Other:   Neurological: [] Normal speech  [] Normal sensation  [x]Deficits present: Lethargic, able to speak 1-2 words only   Extremity: [] Normal skin color/temp  [] Clubbing/cyanosis  [x] No edema  [x] Other: Pale, poor skin turgor    Wt Readings from Last 15 Encounters:   12/09/24 41.3 kg (91 lb 0.8 oz)   09/04/24 54.4 kg (120 lb)   05/03/21 60.7 kg (133 lb 12.8 oz)   03/24/21 62.1 kg (137 lb)        Current Diet: Diet NPO Exceptions are: Ice Chips, Sips of Water with Meds       PSYCHOSOCIAL/SPIRITUAL SCREENING:   Palliative IDT has assessed this patient for cultural preferences / practices and a referral made as appropriate to needs (Cultural Services, Patient Advocacy, Ethics, etc.)    Spiritual Affiliation: Other    Any spiritual / Scientology concerns:  [] Yes /  [x] No   If \"Yes\" to discuss with pastoral care during IDT     Does caregiver feel burdened by caring for their loved one:   [] Yes /  [x] No /  [] No Caregiver Present/Available [] No Caregiver [] Pt Lives at Facility  If \"Yes\" to discuss with social work during IDT    Anticipatory grief assessment:   [x] Normal  / [] Maladaptive     If \"Maladaptive\" to discuss with social work during IDT         LAB AND IMAGING FINDINGS:   Objective data reviewed:  labs, images, records, medication use, vitals, and chart     FINAL COMMENTS   Thank you for allowing Palliative Medicine to participate in the care of Anthony Coffey.    Only check if applicable and billing time based rather than MDM  [] The total encounter time on this service date was ____ minutes which was spent performing a face-to-face encounter and personally completing the provider-level activities documented in the note. This includes time spent prior to the visit and after the visit in direct care of the patient. This time does not include time spent in any separately reportable services.    Electronically signed by   MILLER Gilmore  NP  Palliative Care Team  on 12/12/2024 at 2:08 PM

## 2024-12-12 NOTE — SIGNIFICANT EVENT
Rapid Response  Rapid response room 553 called overhead at 0558. RRT responding.    Rapid response called for hypotension and AMS    Blanka Fish NP, RT, nursing sup Suzie, and primary RN at bedside.       Pt responds to verbal stimuli, but then fall back asleep. BP systolic 60's. EKG completed, sinus tach. Pt placed in trendelenburg. ABG completed by RT. . IVF bolus 500 ml given. Narcan 0.4mg given. Pt alert post narcan. Pt refusing labs.     Checked in with primary RN prior to leaving. Opportunity for questions and concerns provided.      Rapid Response Team Sepsis Screening  Is the patient's history suggestive of a new infection? No    Are two or more SIRS criteria present? No    Is there evidence of Organ Dysfunction? [facility] Sepsis OD: None    Communication with provider: No    Was a Code Sepsis called at this encounter? No

## 2024-12-12 NOTE — PROGRESS NOTES
Day #1 of Zosyn  Indication:  suspected bacteremia  Current regimen:  3.375g IV q6h  Abx regimen: monotherapy  Recent Labs     24  1202 12/10/24  0821 24  0701 24  1022 24  1113   WBC 3.2* 4.6 5.0  --  7.8   CREATININE 2.19* 2.42*  --  2.36*  --    BUN 28* 39*  --  45*  --      Est CrCl: 16 ml/min; UO: -- ml/kg/hr  Temp (24hrs), Av.7 °F (37.1 °C), Min:97.3 °F (36.3 °C), Max:100 °F (37.8 °C)    Cultures: None    Plan: Change to 4500mg IV x1 followed by 3375mg IV q12h via extended infusion per [facility] P&T protocol  with respect to renal function.      Karlene Taylor, PharmD, BCPS

## 2024-12-12 NOTE — PROGRESS NOTES
General Surgery Progress Note    Small bowel obstruction  Date:2024       Room:[address] [place] [address]  Patient Name:Anthony Coffey     YOB: 1950     Age:74 y.o.    Subjective     No acute surgical issues.  Pt was transferred to ICU last night due to hypotension, altered mental status and increased work of breathing.  NG tube was placed in ICU and reportedly had 2 liters feculent output.  Pt is currently resting in bed with high flow oxygen.  KUB showed non-obstructive gas pattern    Medications   Scheduled Meds:    hydrocortisone sodium succinate PF (SOLU-CORTEF) 50 mg in sterile water 2 mL injection  50 mg IntraVENous Q6H    lidocaine   Topical Once    pantoprazole (PROTONIX) 40 mg in sodium chloride (PF) 0.9 % 10 mL injection  40 mg IntraVENous Q12H    piperacillin-tazobactam  3,375 mg IntraVENous Q12H    sodium chloride flush  5-40 mL IntraVENous 2 times per day    heparin (porcine)  5,000 Units SubCUTAneous BID     Continuous Infusions:    norepinephrine 14 mcg/min (24 1352)    sodium chloride 75 mL/hr at 24 0848    sodium chloride 125 mL/hr at 24 1426     PRN Meds: naloxone, ondansetron, prochlorperazine, sodium chloride flush, sodium chloride, polyethylene glycol, acetaminophen **OR** acetaminophen, [Held by provider] HYDROmorphone, ipratropium 0.5 mg-albuterol 2.5 mg        Physical Examination      Vitals:  /86   Pulse (!) 125   Temp 98.1 °F (36.7 °C) (Axillary)   Resp 19   Ht 1.676 m (5' 6\")   Wt 41.3 kg (91 lb 0.8 oz)   SpO2 100%   BMI 14.70 kg/m²   Temp (24hrs), Av.7 °F (37.1 °C), Min:97.3 °F (36.3 °C), Max:100 °F (37.8 °C)      Physical Exam:    Gen:  Cachetic.    Neuro:  Somnolent but arousable  CV:  Tachy  Pulm:  Labored breathing and diminished bilaterally  Abd:  Soft/non-distended/No tenderness to palpation without guarding or rebound  Ext:  No cyanosis, clubbing or edema    I/O (24Hr):    Intake/Output Summary (Last 24 hours) at 2024 1454  Last data  present in the colon. No pathologic calcification. Moderate stool in the right colon. Osseous structures are age appropriate.     No evidence of mechanical bowel obstruction. Electronically signed by Mike Starks    XR ABDOMEN (KUB) (SINGLE AP VIEW)    Result Date: 12/11/2024  INDICATION: Small bowel obstruction COMPARISON: None FINDINGS: Single views of the abdomen submitted for review. Nonspecific bowel gas pattern without evidence for obstruction or mass effect.     1. Nonspecific bowel gas pattern without evidence for obstruction. Electronically signed by Zahida MARTINEZ Edgar        [facility]        Hospital Problems             Last Modified POA    * (Principal) Small bowel obstruction (HCC) 12/9/2024 Yes    Severe malnutrition (HCC) 12/11/2024 Yes        Plan:        - Small bowel obstruction:  Clinically he appeared to be obstructed however KUB continues to show no obstruction.  No emergent indication for operation  - Continue NG tube decompression  - NPO with ice chips  - Agree with CT scan to evaluate for small bowel obstruction versus ileus with fecal impaction    FACE TO FACE time including review of any indicated imaging, discussion with patient, and other providers, exam and discussion with patient: 20 minutes      Electronically signed by Hunter Freed MD on 12/12/24 at 2:58 PM EST

## 2024-12-12 NOTE — PROGRESS NOTES
2245 CMU called patient heart rate went up to 147-149 , . Writer went in room to check on patient he was standing up using a urinal, complained of SOB and chest pain , spo2 at 89 %on r/a , started on oxygen 2 LPM via NC, it went up to 95%, EKG done shows Long Qtc sinus tachycardia with premature supraventricular complexes  ANDREW Moscoso notified

## 2024-12-12 NOTE — PROGRESS NOTES
Cardiology Progress Note  2024     Admit Date: 2024  Admit Diagnosis: Small bowel obstruction (HCC) [K56.609]  Constipation, unspecified constipation type [K59.00]  Nausea and vomiting, unspecified vomiting type [R11.2]  CC: none currently    Assessment:   Principal Problem:    Small bowel obstruction (HCC)  Active Problems:    Severe malnutrition (HCC)  Resolved Problems:    * No resolved hospital problems. *    Plan:     Echo with normal BiV function; pericardial effusion not of acute clinical significance but may be related to lung CA vs low albumin  Holding cardiac meds   On vasopressor support, no indication to add ionotrope    Subjective:      Anthony Coffey events noted      Objective:    Physical Exam:  Overall VSSAF;  /86   Pulse (!) 125   Temp 98.1 °F (36.7 °C) (Axillary)   Resp 19   Ht 1.676 m (5' 6\")   Wt 41.3 kg (91 lb 0.8 oz)   SpO2 100%   BMI 14.70 kg/m²   Temp (24hrs), Av.7 °F (37.1 °C), Min:97.3 °F (36.3 °C), Max:100 °F (37.8 °C)    Patient Vitals for the past 8 hrs:   Pulse   24 1330 (!) 125   24 1245 (!) 112   24 1240 (!) 126   24 1230 (!) 128   24 1215 (!) 129   24 1200 (!) 126   24 1145 (!) 131   24 1130 (!) 128   24 1115 (!) 125   24 1100 (!) 120   24 1045 (!) 122   24 1030 (!) 114   24 1015 (!) 121   24 1000 (!) 135   24 0945 (!) 149   24 0915 (!) 105   24 0802 (!) 129    Patient Vitals for the past 8 hrs:   Resp   24 1330 19   24 1245 18   24 1240 19   24 1230 18   24 1215 20   24 1200 17   24 1145 22   24 1130 (!) 31   24 1115 21   24 1100 21   24 1045 23   24 1030 18   24 1015 22   24 1000 20   24 0945 26   24 0915 27   24 0802 20    Patient Vitals for the past 8 hrs:   BP   24 1330 121/86   24 1240 113/76   24 1230 (!) 87/76   24 1215

## 2024-12-13 ENCOUNTER — APPOINTMENT (OUTPATIENT)
Facility: HOSPITAL | Age: 74
DRG: 388 | End: 2024-12-13
Payer: MEDICARE

## 2024-12-13 VITALS
WEIGHT: 103.4 LBS | DIASTOLIC BLOOD PRESSURE: 69 MMHG | SYSTOLIC BLOOD PRESSURE: 83 MMHG | RESPIRATION RATE: 20 BRPM | HEART RATE: 101 BPM | BODY MASS INDEX: 16.62 KG/M2 | HEIGHT: 66 IN | OXYGEN SATURATION: 97 % | TEMPERATURE: 96.3 F

## 2024-12-13 LAB
ANION GAP SERPL CALC-SCNC: 11 MMOL/L (ref 2–12)
ARTERIAL PATENCY WRIST A: NEGATIVE
ARTERIAL PATENCY WRIST A: NEGATIVE
BASE DEFICIT BLD-SCNC: 7.7 MMOL/L
BASE DEFICIT BLD-SCNC: 9.3 MMOL/L
BDY SITE: ABNORMAL
BDY SITE: ABNORMAL
BUN SERPL-MCNC: 67 MG/DL (ref 6–20)
BUN/CREAT SERPL: 19 (ref 12–20)
CALCIUM SERPL-MCNC: 7.7 MG/DL (ref 8.5–10.1)
CHLORIDE SERPL-SCNC: 116 MMOL/L (ref 97–108)
CO2 SERPL-SCNC: 19 MMOL/L (ref 21–32)
CREAT SERPL-MCNC: 3.59 MG/DL (ref 0.7–1.3)
ERYTHROCYTE [DISTWIDTH] IN BLOOD BY AUTOMATED COUNT: 18.6 % (ref 11.5–14.5)
GAS FLOW.O2 O2 DELIVERY SYS: ABNORMAL
GLUCOSE BLD STRIP.AUTO-MCNC: 104 MG/DL (ref 65–117)
GLUCOSE BLD STRIP.AUTO-MCNC: 77 MG/DL (ref 65–117)
GLUCOSE SERPL-MCNC: 64 MG/DL (ref 65–100)
HCO3 BLD-SCNC: 21.3 MMOL/L (ref 21–28)
HCO3 BLD-SCNC: 21.7 MMOL/L (ref 21–28)
HCT VFR BLD AUTO: 31.1 % (ref 36.6–50.3)
HGB BLD-MCNC: 10 G/DL (ref 12.1–17)
MAGNESIUM SERPL-MCNC: 1.9 MG/DL (ref 1.6–2.4)
MCH RBC QN AUTO: 31.1 PG (ref 26–34)
MCHC RBC AUTO-ENTMCNC: 32.2 G/DL (ref 30–36.5)
MCV RBC AUTO: 96.6 FL (ref 80–99)
NRBC # BLD: 0 K/UL (ref 0–0.01)
NRBC BLD-RTO: 0 PER 100 WBC
O2/TOTAL GAS SETTING VFR VENT: 100 %
O2/TOTAL GAS SETTING VFR VENT: 100 %
PCO2 BLD: 65.3 MMHG (ref 35–48)
PCO2 BLD: 79.1 MMHG (ref 35–48)
PEEP RESPIRATORY: 5 CMH2O
PH BLD: 7.04 (ref 7.35–7.45)
PH BLD: 7.13 (ref 7.35–7.45)
PHOSPHATE SERPL-MCNC: 6.5 MG/DL (ref 2.6–4.7)
PLATELET # BLD AUTO: 144 K/UL (ref 150–400)
PMV BLD AUTO: 9 FL (ref 8.9–12.9)
PO2 BLD: 182 MMHG (ref 83–108)
PO2 BLD: 340 MMHG (ref 83–108)
POTASSIUM SERPL-SCNC: 4.6 MMOL/L (ref 3.5–5.1)
PRESSURE SUPPORT SETTING VENT: 12 CMH2O
RBC # BLD AUTO: 3.22 M/UL (ref 4.1–5.7)
SAO2 % BLD: 98.8 % (ref 92–97)
SAO2 % BLD: 99.9 % (ref 92–97)
SERVICE CMNT-IMP: ABNORMAL
SERVICE CMNT-IMP: ABNORMAL
SERVICE CMNT-IMP: NORMAL
SERVICE CMNT-IMP: NORMAL
SODIUM SERPL-SCNC: 146 MMOL/L (ref 136–145)
SPECIMEN TYPE: ABNORMAL
SPECIMEN TYPE: ABNORMAL
VENTILATION MODE VENT: ABNORMAL
WBC # BLD AUTO: 5.8 K/UL (ref 4.1–11.1)

## 2024-12-13 PROCEDURE — 84100 ASSAY OF PHOSPHORUS: CPT

## 2024-12-13 PROCEDURE — 36415 COLL VENOUS BLD VENIPUNCTURE: CPT

## 2024-12-13 PROCEDURE — 85027 COMPLETE CBC AUTOMATED: CPT

## 2024-12-13 PROCEDURE — 80048 BASIC METABOLIC PNL TOTAL CA: CPT

## 2024-12-13 PROCEDURE — 83735 ASSAY OF MAGNESIUM: CPT

## 2024-12-13 PROCEDURE — 6370000000 HC RX 637 (ALT 250 FOR IP): Performed by: INTERNAL MEDICINE

## 2024-12-13 PROCEDURE — 99498 ADVNCD CARE PLAN ADDL 30 MIN: CPT | Performed by: NURSE PRACTITIONER

## 2024-12-13 PROCEDURE — 6360000002 HC RX W HCPCS: Performed by: INTERNAL MEDICINE

## 2024-12-13 PROCEDURE — 51798 US URINE CAPACITY MEASURE: CPT

## 2024-12-13 PROCEDURE — 2580000003 HC RX 258: Performed by: NURSE PRACTITIONER

## 2024-12-13 PROCEDURE — 6360000002 HC RX W HCPCS: Performed by: NURSE PRACTITIONER

## 2024-12-13 PROCEDURE — 2500000003 HC RX 250 WO HCPCS: Performed by: NURSE PRACTITIONER

## 2024-12-13 PROCEDURE — 74018 RADEX ABDOMEN 1 VIEW: CPT

## 2024-12-13 PROCEDURE — 94660 CPAP INITIATION&MGMT: CPT

## 2024-12-13 PROCEDURE — 5A09357 ASSISTANCE WITH RESPIRATORY VENTILATION, LESS THAN 24 CONSECUTIVE HOURS, CONTINUOUS POSITIVE AIRWAY PRESSURE: ICD-10-PCS | Performed by: INTERNAL MEDICINE

## 2024-12-13 PROCEDURE — 99497 ADVNCD CARE PLAN 30 MIN: CPT | Performed by: NURSE PRACTITIONER

## 2024-12-13 PROCEDURE — 0DH67UZ INSERTION OF FEEDING DEVICE INTO STOMACH, VIA NATURAL OR ARTIFICIAL OPENING: ICD-10-PCS | Performed by: INTERNAL MEDICINE

## 2024-12-13 PROCEDURE — 82803 BLOOD GASES ANY COMBINATION: CPT

## 2024-12-13 PROCEDURE — 94760 N-INVAS EAR/PLS OXIMETRY 1: CPT

## 2024-12-13 PROCEDURE — 36600 WITHDRAWAL OF ARTERIAL BLOOD: CPT

## 2024-12-13 PROCEDURE — 99231 SBSQ HOSP IP/OBS SF/LOW 25: CPT

## 2024-12-13 PROCEDURE — 82962 GLUCOSE BLOOD TEST: CPT

## 2024-12-13 PROCEDURE — 2580000003 HC RX 258: Performed by: INTERNAL MEDICINE

## 2024-12-13 PROCEDURE — 71045 X-RAY EXAM CHEST 1 VIEW: CPT

## 2024-12-13 PROCEDURE — 2500000003 HC RX 250 WO HCPCS: Performed by: INTERNAL MEDICINE

## 2024-12-13 RX ORDER — LORAZEPAM 2 MG/ML
1 INJECTION INTRAMUSCULAR EVERY 30 MIN PRN
Status: DISCONTINUED | OUTPATIENT
Start: 2024-12-13 | End: 2024-12-13

## 2024-12-13 RX ORDER — NOREPINEPHRINE BITARTRATE 0.06 MG/ML
1-100 INJECTION, SOLUTION INTRAVENOUS CONTINUOUS
Status: DISCONTINUED | OUTPATIENT
Start: 2024-12-13 | End: 2024-12-13 | Stop reason: HOSPADM

## 2024-12-13 RX ORDER — LORAZEPAM 2 MG/ML
1 INJECTION INTRAMUSCULAR
Status: DISCONTINUED | OUTPATIENT
Start: 2024-12-13 | End: 2024-12-13 | Stop reason: HOSPADM

## 2024-12-13 RX ORDER — HYDROMORPHONE HYDROCHLORIDE 1 MG/ML
1 INJECTION, SOLUTION INTRAMUSCULAR; INTRAVENOUS; SUBCUTANEOUS EVERY 10 MIN PRN
Status: DISCONTINUED | OUTPATIENT
Start: 2024-12-13 | End: 2024-12-13 | Stop reason: HOSPADM

## 2024-12-13 RX ORDER — FENTANYL CITRATE 50 UG/ML
50 INJECTION, SOLUTION INTRAMUSCULAR; INTRAVENOUS EVERY 30 MIN PRN
Status: DISCONTINUED | OUTPATIENT
Start: 2024-12-13 | End: 2024-12-13

## 2024-12-13 RX ORDER — DEXTROSE MONOHYDRATE AND SODIUM CHLORIDE 5; .9 G/100ML; G/100ML
INJECTION, SOLUTION INTRAVENOUS CONTINUOUS
Status: DISCONTINUED | OUTPATIENT
Start: 2024-12-13 | End: 2024-12-13

## 2024-12-13 RX ORDER — GLYCOPYRROLATE 0.2 MG/ML
0.2 INJECTION INTRAMUSCULAR; INTRAVENOUS EVERY 4 HOURS PRN
Status: DISCONTINUED | OUTPATIENT
Start: 2024-12-13 | End: 2024-12-13 | Stop reason: HOSPADM

## 2024-12-13 RX ADMIN — PANTOPRAZOLE SODIUM 40 MG: 40 INJECTION, POWDER, LYOPHILIZED, FOR SOLUTION INTRAVENOUS at 08:23

## 2024-12-13 RX ADMIN — HYDROMORPHONE HYDROCHLORIDE 1 MG: 1 INJECTION, SOLUTION INTRAMUSCULAR; INTRAVENOUS; SUBCUTANEOUS at 14:03

## 2024-12-13 RX ADMIN — LACTULOSE 30 G: 20 SOLUTION ORAL at 08:37

## 2024-12-13 RX ADMIN — HEPARIN SODIUM 5000 UNITS: 5000 INJECTION INTRAVENOUS; SUBCUTANEOUS at 08:38

## 2024-12-13 RX ADMIN — SODIUM CHLORIDE, PRESERVATIVE FREE 10 ML: 5 INJECTION INTRAVENOUS at 08:27

## 2024-12-13 RX ADMIN — WATER 50 MG: 1 INJECTION INTRAMUSCULAR; INTRAVENOUS; SUBCUTANEOUS at 04:03

## 2024-12-13 RX ADMIN — PIPERACILLIN AND TAZOBACTAM 3375 MG: 3; .375 INJECTION, POWDER, LYOPHILIZED, FOR SOLUTION INTRAVENOUS at 06:24

## 2024-12-13 RX ADMIN — DEXTROSE AND SODIUM CHLORIDE: 5; 900 INJECTION, SOLUTION INTRAVENOUS at 04:15

## 2024-12-13 RX ADMIN — DEXTROSE AND SODIUM CHLORIDE: 5; 900 INJECTION, SOLUTION INTRAVENOUS at 10:09

## 2024-12-13 RX ADMIN — AMIODARONE HYDROCHLORIDE 150 MG: 1.5 INJECTION, SOLUTION INTRAVENOUS at 06:23

## 2024-12-13 RX ADMIN — LORAZEPAM 1 MG: 2 INJECTION INTRAMUSCULAR; INTRAVENOUS at 12:00

## 2024-12-13 RX ADMIN — VASOPRESSIN 0.04 UNITS/MIN: 20 INJECTION INTRAVENOUS at 06:45

## 2024-12-13 RX ADMIN — NOREPINEPHRINE BITARTRATE 25 MCG/MIN: 0.06 INJECTION, SOLUTION INTRAVENOUS at 06:52

## 2024-12-13 RX ADMIN — WATER 50 MG: 1 INJECTION INTRAMUSCULAR; INTRAVENOUS; SUBCUTANEOUS at 08:24

## 2024-12-13 ASSESSMENT — PAIN SCALES - GENERAL: PAINLEVEL_OUTOF10: 0

## 2024-12-13 NOTE — PROGRESS NOTES
4 Eyes Skin Assessment     NAME:  Anthony Coffey  YOB: 1950  MEDICAL RECORD NUMBER:  [id number]    The patient is being assessed for  Transfer to New Unit    I agree that at least one RN has performed a thorough Head to Toe Skin Assessment on the patient. ALL assessment sites listed below have been assessed.      Areas assessed by both nurses:    Head, Face, Ears, Shoulders, Back, Chest, Arms, Elbows, Hands, Sacrum. Buttock, Coccyx, Ischium, Legs. Feet and Heels, and Under Medical Devices         Does the Patient have a Wound? Yes wound(s) were present on assessment. LDA wound assessment was Initiated and completed by RN       Ja Prevention initiated by RN: Yes  Wound Care Orders initiated by RN: Yes    Pressure Injury (Stage 3,4, Unstageable, DTI, NWPT, and Complex wounds) if present, place Wound referral order by RN under : Yes    New Ostomies, if present place, Ostomy referral order under : No     Nurse 1 eSignature: Electronically signed by Rita Busch RN on 12/12/24 at 8:09 PM EST    **SHARE this note so that the co-signing nurse can place an eSignature**    Nurse 2 eSignature: Electronically signed by Benjamín Ruggiero RN on 12/12/24 at 8:22 PM EST

## 2024-12-13 NOTE — PROGRESS NOTES
General Surgery Daily Progress Note    Admit Date: 2024  Post-Operative Day: * No surgery found * from * No surgery found *     Subjective:       Last 24 hrs: Pt remains in ICU on BIPAP and on pressors.  He is lethargic, opens eyes to voice.  NGT removed by pt yesterday. ICU team placed dobbhoff. Dr. Torres reports lactulose and suppository given yesterday and pt noted to have two small bowel smears.   CT abd/pelvis done yesterday showed worsening high-grade distal small bowel obstruction pattern.           Objective:     Blood pressure (!) 85/57, pulse 94, temperature (!) 96.3 °F (35.7 °C), temperature source Axillary, resp. rate 20, height 1.676 m (5' 6\"), weight 46.9 kg (103 lb 6.3 oz), SpO2 97%.  Temp (24hrs), Av.5 °F (36.4 °C), Min:96.3 °F (35.7 °C), Max:97.9 °F (36.6 °C)      _____________________  Physical Exam:     Lethargic, opens eyes to voice.  Cachectic and ill appearing  Cardiovascular: RRR, S1S2  Lungs:labored.  Lung sounds diminished anteriorly bilaterally   Abdomen: Soft, non distended, NT to palpation.  No guarding or rebound.     Data Review:    Recent Labs     24  0701 24  1113 24  0139   WBC 5.0 7.8 5.8   HGB 10.5* 10.8* 10.0*   HCT 31.7* 33.9* 31.1*    179 144*     Recent Labs     24  1022 24  1113 24  2106 24  0139    147* 147* 146*   K 4.6 4.7 4.7 4.6   * 115* 117* 116*   CO2 19* 19* 20* 19*   BUN 45* 61* 66* 67*   MG  --   --   --  1.9   PHOS  --   --   --  6.5*   ALT 13  --   --   --      Invalid input(s): \"AML\", \"LPSE\"        ______________________  Medications:    Current Facility-Administered Medications   Medication Dose Route Frequency    dextrose 5 % and 0.9 % sodium chloride infusion   IntraVENous Continuous    amiodarone (NEXTERONE) 360 mg in dextrose 5% 200 ml  1 mg/min IntraVENous Continuous    Followed by    amiodarone (NEXTERONE) 360 mg in dextrose 5% 200 ml  0.5 mg/min IntraVENous Continuous    dextrose 5 % and  0.9 % nacl bolus  500 mL IntraVENous Once    VASOpressin (VASOSTRICT) 20 units in sodium chloride 0.9% 100 mL infusion  0.04 Units/min IntraVENous Continuous    norepinephrine (LEVOPHED) 16 mg in sodium chloride 0.9 % 250 mL infusion  1-100 mcg/min IntraVENous Continuous    naloxone (NARCAN) injection 0.2 mg  0.2 mg IntraVENous PRN    hydrocortisone sodium succinate PF (SOLU-CORTEF) 50 mg in sterile water 2 mL injection  50 mg IntraVENous Q6H    lidocaine (XYLOCAINE) 2 % uro-jet   Topical Once    pantoprazole (PROTONIX) 40 mg in sodium chloride (PF) 0.9 % 10 mL injection  40 mg IntraVENous Q12H    piperacillin-tazobactam (ZOSYN) 3,375 mg in sodium chloride 0.9 % 50 mL IVPB (mini-bag)  3,375 mg IntraVENous Q12H    bisacodyl (DULCOLAX) suppository 10 mg  10 mg Rectal Daily PRN    lactulose (CHRONULAC) 10 GM/15ML solution 30 g  30 g Oral Daily    ondansetron (ZOFRAN) injection 4 mg  4 mg IntraVENous Q4H PRN    prochlorperazine (COMPAZINE) injection 10 mg  10 mg IntraVENous Q6H PRN    sodium chloride flush 0.9 % injection 5-40 mL  5-40 mL IntraVENous 2 times per day    sodium chloride flush 0.9 % injection 5-40 mL  5-40 mL IntraVENous PRN    0.9 % sodium chloride infusion   IntraVENous PRN    heparin (porcine) injection 5,000 Units  5,000 Units SubCUTAneous BID    polyethylene glycol (GLYCOLAX) packet 17 g  17 g Oral Daily PRN    acetaminophen (TYLENOL) tablet 650 mg  650 mg Oral Q6H PRN    Or    acetaminophen (TYLENOL) suppository 650 mg  650 mg Rectal Q6H PRN    [Held by provider] HYDROmorphone HCl PF (DILAUDID) injection 1 mg  1 mg IntraVENous Q4H PRN    ipratropium 0.5 mg-albuterol 2.5 mg (DUONEB) nebulizer solution 1 Dose  1 Dose Inhalation Q4H PRN               Assessment:   Principal Problem:    Small bowel obstruction (HCC)  Active Problems:    Severe malnutrition (HCC)  Resolved Problems:    * No resolved hospital problems. *          Plan:     SBO:  No operative plans.  Spoke with Dr. Torres -pt not operative

## 2024-12-13 NOTE — PROGRESS NOTES
0600 - after bathing pt. became unresponsive.  A.fib RVR, Agonal breathing SPO2 < 80%, MAP < 65. Mayelin NP at bedside. Orders for Bipap/ABG/Amio bolus/Vaso. See MAR for titration

## 2024-12-13 NOTE — DEATH NOTES
Death Pronouncement Note  Patient's Name: Anthony Coffey   Patient's YOB: 1950  MRN Number: [id number]    Admitting Provider: Jimena Todd MD  Attending Provider: Ning Torres MD    Patient was examined and the following were absent: Pulses, Blood Pressure, and Respiratory effort    I declared the patient dead on 12/13/2024 at 1405.    Preliminary Cause of Death:   Acute hypoxic and hypercapnic respiratory failure   Septic shock   Small bowel obstruction   Severe malnutrition      Electronically signed by Ning Torres MD on 12/13/24 at 4:11 PM EST

## 2024-12-13 NOTE — PROGRESS NOTES
Spiritual Health History and Assessment/Progress Note  [facility]    Spiritual/Emotional Needs, Grief, Loss, and Adjustments,  , Anticipatory Grief,      Name: Anthony Coffey MRN: [id number]    Age: 74 y.o.     Sex: male   Language: English   Christianity: Jewish   Small bowel obstruction (HCC)     Date: 12/13/2024            Total Time Calculated: 35 min              Spiritual Assessment continued in [facility] 7S2 INTENSIVE CARE        Referral/Consult From: Physician   Encounter Overview/Reason: Spiritual/Emotional Needs, Grief, Loss, and Adjustments  Service Provided For: Patient and family together    Sapna, Belief, Meaning:   Patient is connected with a sapna tradition or spiritual practice and Other: Pt's spouse confirmed that he is Jewish  Family/Friends are connected with a sapna tradition or spiritual practice and have beliefs or practices that help with coping during difficult times      Importance and Influence:  Patient has spiritual/personal beliefs that influence decisions regarding their health and Other: Affirmed according to spouse  Family/Friends have spiritual/personal beliefs that influence decisions regarding the patient's health    Community:  Patient Other: N/A  Family/Friends feel well-supported. Support system includes: Children    Assessment and Plan of Care:     Patient Interventions include: Other: N/A  Family/Friends Interventions include: Facilitated expression of thoughts and feelings, Explored spiritual coping/struggle/distress, Engaged in theological reflection, and Other: Initiated relationship of care/trust; pt's wife, Diana, son, and brother are all in the room; Diana shared they have been  for 45 years, and provided insight on his medical journey over the past few years. Diana confirmed that pt is of the Jewish sapna; I offered spiritual presence through words of comfort and blessing.     Patient Plan of Care: Spiritual Care available upon further referral and Other:  Called [facility] for anointing of the sick; Left Message ; family is waiting for another person to arrive before transition to comfort focused care; I have handed off to team  for follow up and coordination of spiritual care.   Family/Friends Plan of Care: Other: Ongoing     Electronically signed by LOGAN Jimenez on 12/13/2024 at 12:11 PM  For additional spiritual care, please contact the  on-call at ([phone]).    Jesi Ardon MDiv, MS, BCC  Staff   Spiritual Health Services

## 2024-12-13 NOTE — CONSULTS
CRITICAL CARE ADMISSION NOTE      Name: Anthony Coffey   : 1950   MRN: [id number]   Date: 2024      Reason for ICU Admission: hypovolemic shock     ASSESSMENT and PLAN   Hypovolemic shock:  ? Septic due to pna ?    -cont resuscitation  -start NE, titrate to MAP > 65  -start empiric zosyn empirically for ? Pna and will cover intra-abdominal infection  -lactate 0.9 ----> 5.6  -PCT 20  -stress dose steroids hydrocort 50 q 6 IV     Acute hypoxi respiratory failure:  -may be related to pleural effusions or poor perfusion and spo2 reading  -po2 69  -start HFNC    Ileus vs SBO:   -repeat CT post stomach evacuation suggests worsening obstruction.   -no abd pain or tenderness   -NG placed and quickly drained 2+L of feculent material  --->  nausea immediately better  -place DHT and start lactulose daily   -bisacodyl suppository - eval for fecal impaction   -he is so dry we will let him have sips and chips and gentle clears as long as he can tolerate this     ALICIA on CKD:   -cr from 2.3 --> 3.5 overnight.  Suspect insult from hypotension   -baseline appears to be 1.7 -2.0  -cont resuscitation  -renal consult if worsens tomorrow   -he is oliguic     Afib with rvr:  -running around 120s  -new finding  -echo reviewed EF 50%, no PH no valvular disease, small pericardial effusion   -monitor for now   -cards following      Esophagitis:  -viscous lidocaine and chloreseptic spray     End of life discussion:  -may have survivable cancer and this abd event is not currently felt to be life limiting.  However, his severe malnutrition and cachexia could lead us to trouble.  Discussed with pt, his wife and his son this am and again this afternoon.  I recommend no CPR given his BMI of 14 and he declines intubation.  We made the plan to change him to DNR but cont other advanced measures such as ICU care, vasopressor support, cardioversion for afib if needed, HFNC and NIV.        HISTORY OF PRESENT ILLNESS:     Mr Coffey

## 2024-12-13 NOTE — DISCHARGE SUMMARY
[facility]                                                                                         Death Discharge Summary     Patient: Anthony Coffey       MRN: [id number]       YOB: 1950       Age: 74 y.o.   Admitting Provider:  Jimena Todd MD  Admission Diagnosis: Small bowel obstruction (HCC) [K56.609]  Constipation, unspecified constipation type [K59.00]  Nausea and vomiting, unspecified vomiting type [R11.2]    Date of Death: 12/13/2024  Time of Death: 1405    Physical Exam:  Pupils: Fixed, Dilated   Lungs: Absent lung sounds   Cardiac: Absent heart sounds, no pulses    Hospital Course:  Anthony Coffey was a 74yoM with COPD, HTN, CKD, dyslipidemia, lung cancer on radiation therapy, who presented on 12/9 with abdominal pain, N/V, and weakness in the setting of a small bowel obstruction. He was initially treated with bowel rest and IVF, but later developed tachycardia, hypotension, hypoxia, and AMS necessitating transfer to ICU on 12/12. He initially refused interventions, but eventually agreed to lab draws, HFNC, and nasogastric tube for bowel decompression which revealed 2L of feculent material. Repeat imaging showed worsening high grade distal SBO. Patient deemed to not be a surgical candidate due to lung cancer and poor likelihood of surviving operation. He ultimately went into undifferentiated shock, hypovolemic vs septic, requiring broad spectrum abx, vasopressors, and stress dose steroids. His course was further complicated by AF with RVR requiring amiodarone. On 12/13, he acutely decompensated with worsening mental status, hypoxia, and respiratory acidosis requiring bipap and addition of a second vasopressor. Given his rapid decline and poor prognosis, the family elected to transition to comfort care. He passed peacefully on 12/13 at 1405 with family at bedside.     Other Conditions Managed: ALICIA on CKD, Esophagitis, Acute Hypoxemic Hypercapnic Respiratory

## 2024-12-13 NOTE — PROGRESS NOTES
Palliative Medicine      Code Status: DNR    Advance Care Planning:    No AMD, spouse is LNOK and decision maker     Patient / Family Encounter Documentation    Participants (names): Diana (patient's spouse), patient's YAEL, son Arjun, daughter Laya     Narrative: Palliative Medicine SW spoke with ICU MD - per MD, family is transitioning to comfort focused care.     Palliative Medicine SW met with the patient's wife Diana and her brother/patient's YAEL, - later during encounter daughter Laya and son Arjun enter.     SW provided emotional support to Diana- appropriately tearful given the significance of this loss, SW validated her feelings of heartbreak. SW participated in life review- she reflected on their beautiful marriage of 45 years.     Diana processes their journey, she shares that the patient has been through so much and as difficult as it is, she does not want him to suffer. She repeatedly says, \"I don't want him to suffer\" and wants to make sure he is comfortable with medications \"like going to sleep..\" SW acknowledged the difficulty of the decision, and also validated/reassured her that the medical team is in support of the decision to transition to comfort - reframing/acknowledging it does not make it easier, however, a decision out of love for the patient. She shares, \"I don't want him to die - but I don't want him to suffer like this.\" She reflects that he doesn't acknowledge her presence anymore, \"doesn't know I'm here,\" and SW encouraged her to continue to express words of love/things she needs to say to her , and we assume patients can hear us even if they are unable to respond due to how sick they are.     Diana shares that the grief \"comes in waves\" and SW provided psychoeducation on grief- validating her experience of waves, and normalized the grieving process/coming and going of large emotions.     Diana is supportive of visit, she reflects on the support of her two children and how

## 2024-12-13 NOTE — ACP (ADVANCE CARE PLANNING)
Advance Care Planning      Palliative Medicine Provider (MD/NP)  Advance Care Planning (ACP) Conversation      Date of Conversation: 12/13/24  The patient and/or authorized decision maker consented to a voluntary Advance Care Planning conversation.   Individuals present for the conversation:   Spouse  , Son  , and Daughter      Legal Healthcare Agent(s): dru Ortiz is the legal next of kin      ACP documents available in EMR prior to discussion:  -None    Primary Palliative Diagnosis(es):  Hypotension  Hypoxia  Shortness of breath  Feeding difficulties  Delirium  End of life    Conversation Summary:  Family affirmed decision to transition to comfort care today after multiple discussions with Dr. Torres.  Met with family along with Linda WOODARD. Supportive services provided.  All questions and concerns addressed  Additional comfort care orders placed. Discussed in detail with the primary RN  Prognosis estimated in minutes to short hours with some degree of uncertainty.   also providing spiritual support  Hospice consult pending survival    Resuscitation Status:    Code Status: DNR       I spent 65 minutes providing separately identifiable ACP services with the patient and/or surrogate decision maker in a voluntary, in-person conversation discussing the patient's wishes and goals as detailed in the above note.       MILLER Gilmore - NP

## 2024-12-13 NOTE — PROGRESS NOTES
Cardiology Progress Note  2024     Admit Date: 2024  Admit Diagnosis: Small bowel obstruction (HCC) [K56.609]  Constipation, unspecified constipation type [K59.00]  Nausea and vomiting, unspecified vomiting type [R11.2]  CC: none currently    Assessment:   Principal Problem:    Small bowel obstruction (HCC)  Active Problems:    Severe malnutrition (HCC)  Resolved Problems:    * No resolved hospital problems. *    Plan:     Echo with normal BiV function; pericardial effusion not of acute clinical significance but may be related to lung CA vs low albumin  Holding cardiac meds   On vasopressor support, no indication to add ionotrope  IV amiodarone  Monitoring lytes    Total critical care time - 30 minutes (CPT 90687)     MDM:  High  Risk of decompensation:  High     I personally spent the above critical care time.  This is time spent at this critically ill patient's bedside / unit / floor actively involved in patient care as well as the coordination of care and discussions with the patient's family.  This does not include any procedural time which has been billed separately.    Subjective:      Anthony Coffey with episode of RVR this AM. Responded to IV amiodarone      Objective:    Physical Exam:  Overall VSSAF;  BP (!) 85/57   Pulse 94   Temp (!) 96.3 °F (35.7 °C) (Axillary) Comment: warm blankets applied Comment (Src): groin  Resp 20   Ht 1.676 m (5' 6\")   Wt 46.9 kg (103 lb 6.3 oz)   SpO2 97%   BMI 16.69 kg/m²   Temp (24hrs), Av.5 °F (36.4 °C), Min:96.3 °F (35.7 °C), Max:97.9 °F (36.6 °C)    Patient Vitals for the past 8 hrs:   Pulse   24 1000 94   24 0908 97   24 0900 94   24 0800 96   24 0700 (!) 102   24 0600 (!) 146   24 0500 (!) 114   24 0400 (!) 113   24 0300 (!) 117    Patient Vitals for the past 8 hrs:   Resp   24 1000 20   24 0908 20   24 0900 18   24 0800 17   24 0700 19   24 0600 27

## 2024-12-13 NOTE — CARE COORDINATION
Transition of Care Plan:    RUR: 16%  Prior Level of Functioning: Independent   Caregiver Contact: Wife Diana Coffey   Patient admitted for SBO   Patient transferred to ICU 12/12 for hypovolemic shock , sepsis r/t PNA  and acute hypoxic respiratory failure.  Patient paced on Bipap and pressors.   Palliative ream met with family and they have decided to transition patient to comfort.  Care Manager available.  Vee Canela RN,Care Management   normal...

## 2024-12-13 NOTE — PROGRESS NOTES
Spiritual Health History and Assessment/Progress Note  [facility]    Family Care, Crisis, Spiritual/Emotional Needs,  , End of Life, Grief and loss,      Name: Anthony Coffey MRN: [id number]    Age: 74 y.o.     Sex: male   Language: English   Congregation: Mosque   Small bowel obstruction (HCC)     Date: 12/13/2024            Total Time Calculated: 79 min              Spiritual Assessment continued in [facility] 7S2 INTENSIVE CARE        Referral/Consult From: Other    Encounter Overview/Reason: Family Care, Crisis, Spiritual/Emotional Needs  Service Provided For: Patient and family together    Sapna, Belief, Meaning:   Patient is connected with a sapna tradition or spiritual practice and has beliefs or practices that help with coping during difficult times  Family/Friends are connected with a sapna tradition or spiritual practice and have beliefs or practices that help with coping during difficult times      Importance and Influence:  Patient has spiritual/personal beliefs that influence decisions regarding their health  Family/Friends have spiritual/personal beliefs that influence decisions regarding the patient's health    Community:  Patient feels well-supported. Support system includes: Spouse/Partner and Children  Family/Friends feel well-supported. Support system includes: Spouse/Partner, Parent/s, Children, and Extended family    Assessment and Plan of Care:     Patient Interventions include: Facilitated expression of thoughts and feelings, Explored spiritual coping/struggle/distress, Engaged in theological reflection, Affirmed coping skills/support systems, and Provided sacramental/Muslim ritual  Family/Friends Interventions include: Facilitated expression of thoughts and feelings, Explored spiritual coping/struggle/distress, Engaged in theological reflection, Affirmed coping skills/support systems, and Provided sacramental/Muslim ritual    Patient Plan of Care: No spiritual needs identified

## 2024-12-13 NOTE — PROGRESS NOTES
[facility] ICU Progress Note        Anthony Coffey  1950  [id number]  12/13/2024      Assessment and plan:  Hypovolemic shock:  worse overnight  -increased pressors requirements related to acute hypercapnia  -post resuscitation   -cont NE, titrate to MAP > 65  -start empiric zosyn empirically for ? Pna and will cover intra-abdominal infection  -lactate 0.9 ----> 5.6  -PCT 20  -cont stress dose steroids hydrocort 50 q 6 IV      Acute hypoxic and hypercapnic respiratory failure:  -co2 72  -started on NIV   -concern for NM weakness and he may be dying given progression over the last 24 hours    -changed ipap to increase pressure support, target MV 9-10  -decreased fio2       High grade SBO:   -repeat CT post stomach evacuation suggests worsening obstruction.   -no abd pain or tenderness   -NG placed and quickly drained 2+L of feculent material  --->  nausea immediately better  -lactulose daily   -not a surgical candidate      ALICIA on CKD: worse this am now anuric   -cr from 2.3 --> 3.5  --> 4.6  Suspect insult from hypotension   -baseline appears to be 1.7 -2.0     Afib with rvr:  responded to amio bolus early am.    -add gtt if needed   -running around 120s  -new finding  -echo reviewed EF 50%, no PH no valvular disease, small pericardial effusion   -monitor for now   -cards following       Esophagitis:  -viscous lidocaine and chloreseptic spray      End of life discussion:  -much worse overnight with acute hypercapnia. May be actively dying and NM weakness is contributing. Discussed time limited trial of therapy ofintubation but I feel this is quite risky and might precipitate death.      We also discussed option of transitioning to comfort care.      Repeat ABG later in the am reveals he is not responding well to NIV.  Plan made to transition to CC.    called to bedside   PCM is following.      Will support family as able.      HPI:  much worse overnight.  Now on NIV.  Not responsive  this am.        Imaging reviewed   Abd ct with worsening high grade SBO    CCM time:   65 mins.  This patient is critically ill with risk of clinical deterioration.  The documented time was time spent providing direct patient care, formulating care plan and discussing this plan with other providers, updating family and discussing goals of care with patient and/or family. It does not include time spent performing any procedures that are billed separtately.    Ning Torres MD    Pulmonary and Critical Care Medicine   [facility]  [phone]  12/13/2024

## 2024-12-29 NOTE — PROGRESS NOTES
Physician Progress Note      PATIENT:               ANDERSON MAIER  [facility] #:                  [id number]  :                       1950  ADMIT DATE:       2024 11:53 AM  DISCH DATE:        2024 3:00 PM  RESPONDING  PROVIDER #:        Ning Torres MD          QUERY TEXT:    Patient admitted with   SBO , noted to have Bilateral lower lobe airspace   disease consistent with pneumonia  on  cxr   .  If possible, please   document in progress notes and discharge summary if you are evaluating and/or   treating any of the following:    The medical record reflects the following:  Risk Factors: lung  cancer  Clinical Indicators: -  cxr-    Bilateral lower lobe airspace disease   consistent with pneumonia  Treatment: Zosyn   4,500  mg  IV   x  1  dose    Thank you  very  much  Options provided:  -- Pneumonia,  confirmed    not  POA  -- cxr   report -  Bilateral lower lobe airspace disease consistent with   pneumonia  not clinically significant  -- Other - I will add my own diagnosis  -- Disagree - Not applicable / Not valid  -- Disagree - Clinically unable to determine / Unknown  -- Refer to Clinical Documentation Reviewer    PROVIDER RESPONSE TEXT:    Acute hypoxic and hypercapnic respiratory failure: -co2 72 -started on NIV   -concern for NM weakness and he may be dying given progression over the last   24 hours -changed ipap to increase pressure support, target MV 9-10  -cxr with bilateral airspace disease. Felt to be lobar collapse.    Query created by: Victoria Pride on 2024 6:58 AM      Electronically signed by:  Ning Torres MD 2024 7:23 AM

## (undated) PROCEDURE — 0DH67UZ INSERTION OF FEEDING DEVICE INTO STOMACH, VIA NATURAL OR ARTIFICIAL OPENING: ICD-10-PCS

## (undated) DEVICE — HANDLE LT SNAP ON ULT DURABLE LENS FOR TRUMPF ALC DISPOSABLE

## (undated) DEVICE — BLADE ASSEMB CLP HAIR FINE --

## (undated) DEVICE — SUTURE VCRL SZ 0 L27IN ABSRB UD L26MM CT-2 1/2 CIR J270H

## (undated) DEVICE — NEEDLE HYPO 25GA L1.5IN BVL ORIENTED ECLIPSE

## (undated) DEVICE — PENCIL SMK EVAC 10 FT BLADE ELECTRD ROCKER FOR TELSCP

## (undated) DEVICE — SURGICAL PROCEDURE PACK BASIN MAJ SET CUST NO CAUT

## (undated) DEVICE — PREP SKN CHLRAPRP APL 26ML STR --

## (undated) DEVICE — STERILE POLYISOPRENE POWDER-FREE SURGICAL GLOVES WITH EMOLLIENT COATING: Brand: PROTEXIS

## (undated) DEVICE — NEEDLE HYPO 22GA L1.5IN BLK S STL HUB POLYPR SHLD REG BVL

## (undated) DEVICE — DERMABOND SKIN ADH 0.7ML -- DERMABOND ADVANCED 12/BX

## (undated) DEVICE — SUTURE VCRL SZ 3-0 L27IN ABSRB UD L26MM SH 1/2 CIR J416H

## (undated) DEVICE — REM POLYHESIVE ADULT PATIENT RETURN ELECTRODE: Brand: VALLEYLAB

## (undated) DEVICE — DRAPE,UTILTY,TAPE,15X26, 4EA/PK: Brand: MEDLINE

## (undated) DEVICE — STRAP,POSITIONING,KNEE/BODY,FOAM,4X60": Brand: MEDLINE

## (undated) DEVICE — GARMENT,MEDLINE,DVT,INT,CALF,MED, GEN2: Brand: MEDLINE

## (undated) DEVICE — SUTURE MCRYL SZ 4-0 L27IN ABSRB UD L19MM PS-2 1/2 CIR PRIM Y426H

## (undated) DEVICE — SYR 10ML LUER LOK 1/5ML GRAD --

## (undated) DEVICE — PACK,LAPAROTOMY,2 REINFORCED GOWNS: Brand: MEDLINE

## (undated) DEVICE — INFECTION CONTROL KIT SYS

## (undated) DEVICE — SOL IRR SOD CL 0.9% 1000ML BTL --